# Patient Record
Sex: FEMALE | Race: WHITE | Employment: UNEMPLOYED | URBAN - NONMETROPOLITAN AREA
[De-identification: names, ages, dates, MRNs, and addresses within clinical notes are randomized per-mention and may not be internally consistent; named-entity substitution may affect disease eponyms.]

---

## 2018-05-03 ENCOUNTER — OFFICE VISIT (OUTPATIENT)
Dept: PRIMARY CARE CLINIC | Age: 63
End: 2018-05-03
Payer: COMMERCIAL

## 2018-05-03 VITALS
TEMPERATURE: 97.9 F | HEART RATE: 72 BPM | WEIGHT: 130 LBS | HEIGHT: 68 IN | SYSTOLIC BLOOD PRESSURE: 138 MMHG | DIASTOLIC BLOOD PRESSURE: 84 MMHG | RESPIRATION RATE: 22 BRPM | BODY MASS INDEX: 19.7 KG/M2

## 2018-05-03 DIAGNOSIS — Z12.31 SCREENING MAMMOGRAM, ENCOUNTER FOR: ICD-10-CM

## 2018-05-03 DIAGNOSIS — F90.9 ATTENTION DEFICIT HYPERACTIVITY DISORDER (ADHD), UNSPECIFIED ADHD TYPE: Primary | ICD-10-CM

## 2018-05-03 DIAGNOSIS — R68.84 CHRONIC JAW PAIN: ICD-10-CM

## 2018-05-03 DIAGNOSIS — G89.29 CHRONIC JAW PAIN: ICD-10-CM

## 2018-05-03 DIAGNOSIS — Z12.11 SCREENING FOR COLON CANCER: ICD-10-CM

## 2018-05-03 DIAGNOSIS — H66.3X1 CHRONIC SUPPURATIVE OTITIS MEDIA OF RIGHT EAR, UNSPECIFIED OTITIS MEDIA LOCATION: ICD-10-CM

## 2018-05-03 PROCEDURE — G8427 DOCREV CUR MEDS BY ELIG CLIN: HCPCS | Performed by: FAMILY MEDICINE

## 2018-05-03 PROCEDURE — 3017F COLORECTAL CA SCREEN DOC REV: CPT | Performed by: FAMILY MEDICINE

## 2018-05-03 PROCEDURE — 99203 OFFICE O/P NEW LOW 30 MIN: CPT | Performed by: FAMILY MEDICINE

## 2018-05-03 PROCEDURE — G8420 CALC BMI NORM PARAMETERS: HCPCS | Performed by: FAMILY MEDICINE

## 2018-05-03 PROCEDURE — 4004F PT TOBACCO SCREEN RCVD TLK: CPT | Performed by: FAMILY MEDICINE

## 2018-05-03 RX ORDER — DEXMETHYLPHENIDATE HYDROCHLORIDE 20 MG/1
20 CAPSULE, EXTENDED RELEASE ORAL DAILY
Qty: 30 CAPSULE | Refills: 0 | Status: SHIPPED | OUTPATIENT
Start: 2018-05-03 | End: 2018-05-30 | Stop reason: SDUPTHER

## 2018-05-03 RX ORDER — DEXMETHYLPHENIDATE HYDROCHLORIDE 20 MG/1
20 CAPSULE, EXTENDED RELEASE ORAL DAILY
COMMUNITY
End: 2018-05-03 | Stop reason: SDUPTHER

## 2018-05-03 RX ORDER — OFLOXACIN 3 MG/ML
5 SOLUTION AURICULAR (OTIC) 2 TIMES DAILY
Qty: 5 ML | Refills: 0 | Status: SHIPPED | OUTPATIENT
Start: 2018-05-03 | End: 2018-05-13

## 2018-05-03 RX ORDER — CYCLOBENZAPRINE HCL 5 MG
5 TABLET ORAL 2 TIMES DAILY PRN
Qty: 45 TABLET | Refills: 2 | Status: SHIPPED | OUTPATIENT
Start: 2018-05-03 | End: 2018-05-13

## 2018-05-03 ASSESSMENT — ENCOUNTER SYMPTOMS
COUGH: 0
ABDOMINAL PAIN: 0
VOMITING: 0
BACK PAIN: 0
NAUSEA: 0
WHEEZING: 0
COLOR CHANGE: 0
EYE DISCHARGE: 0
DIARRHEA: 0

## 2018-05-08 ENCOUNTER — TELEPHONE (OUTPATIENT)
Dept: OTOLARYNGOLOGY | Age: 63
End: 2018-05-08

## 2018-05-09 ENCOUNTER — TELEPHONE (OUTPATIENT)
Dept: GASTROENTEROLOGY | Age: 63
End: 2018-05-09

## 2018-05-11 ENCOUNTER — TELEPHONE (OUTPATIENT)
Dept: OTOLARYNGOLOGY | Age: 63
End: 2018-05-11

## 2018-05-16 ENCOUNTER — TELEPHONE (OUTPATIENT)
Dept: OTOLARYNGOLOGY | Age: 63
End: 2018-05-16

## 2018-05-29 ENCOUNTER — TELEPHONE (OUTPATIENT)
Dept: PRIMARY CARE CLINIC | Age: 63
End: 2018-05-29

## 2018-05-30 DIAGNOSIS — F90.9 ATTENTION DEFICIT HYPERACTIVITY DISORDER (ADHD), UNSPECIFIED ADHD TYPE: Primary | ICD-10-CM

## 2018-05-30 RX ORDER — DEXMETHYLPHENIDATE HYDROCHLORIDE 20 MG/1
20 CAPSULE, EXTENDED RELEASE ORAL DAILY
Qty: 30 CAPSULE | Refills: 0 | Status: SHIPPED | OUTPATIENT
Start: 2018-05-30 | End: 2018-06-29

## 2018-06-04 ENCOUNTER — OFFICE VISIT (OUTPATIENT)
Dept: PRIMARY CARE CLINIC | Age: 63
End: 2018-06-04
Payer: COMMERCIAL

## 2018-06-04 VITALS
OXYGEN SATURATION: 98 % | TEMPERATURE: 98.1 F | DIASTOLIC BLOOD PRESSURE: 86 MMHG | RESPIRATION RATE: 22 BRPM | HEIGHT: 68 IN | WEIGHT: 119 LBS | BODY MASS INDEX: 18.04 KG/M2 | SYSTOLIC BLOOD PRESSURE: 134 MMHG | HEART RATE: 65 BPM

## 2018-06-04 DIAGNOSIS — G89.29 CHRONIC JAW PAIN: Primary | ICD-10-CM

## 2018-06-04 DIAGNOSIS — R68.84 CHRONIC JAW PAIN: Primary | ICD-10-CM

## 2018-06-04 PROCEDURE — 99213 OFFICE O/P EST LOW 20 MIN: CPT | Performed by: FAMILY MEDICINE

## 2018-06-04 PROCEDURE — G8427 DOCREV CUR MEDS BY ELIG CLIN: HCPCS | Performed by: FAMILY MEDICINE

## 2018-06-04 PROCEDURE — G8419 CALC BMI OUT NRM PARAM NOF/U: HCPCS | Performed by: FAMILY MEDICINE

## 2018-06-04 PROCEDURE — 4004F PT TOBACCO SCREEN RCVD TLK: CPT | Performed by: FAMILY MEDICINE

## 2018-06-04 PROCEDURE — 3017F COLORECTAL CA SCREEN DOC REV: CPT | Performed by: FAMILY MEDICINE

## 2018-06-04 RX ORDER — TIZANIDINE 4 MG/1
4 TABLET ORAL 3 TIMES DAILY
Qty: 45 TABLET | Refills: 0 | Status: SHIPPED | OUTPATIENT
Start: 2018-06-04 | End: 2018-06-29

## 2018-06-14 ASSESSMENT — ENCOUNTER SYMPTOMS
VOMITING: 0
DIARRHEA: 0
EYE DISCHARGE: 0
WHEEZING: 0
NAUSEA: 0
COLOR CHANGE: 0
BACK PAIN: 0
COUGH: 0
ABDOMINAL PAIN: 0

## 2018-06-18 ENCOUNTER — OFFICE VISIT (OUTPATIENT)
Dept: URGENT CARE | Age: 63
End: 2018-06-18

## 2018-06-18 ENCOUNTER — OFFICE VISIT (OUTPATIENT)
Dept: OTOLARYNGOLOGY | Age: 63
End: 2018-06-18
Payer: COMMERCIAL

## 2018-06-18 VITALS
HEIGHT: 68 IN | WEIGHT: 115 LBS | HEART RATE: 72 BPM | DIASTOLIC BLOOD PRESSURE: 80 MMHG | OXYGEN SATURATION: 98 % | BODY MASS INDEX: 17.43 KG/M2 | TEMPERATURE: 98.1 F | SYSTOLIC BLOOD PRESSURE: 138 MMHG

## 2018-06-18 DIAGNOSIS — H66.3X1 CHRONIC SUPPURATIVE OTITIS MEDIA OF RIGHT EAR, UNSPECIFIED OTITIS MEDIA LOCATION: Primary | ICD-10-CM

## 2018-06-18 DIAGNOSIS — R68.84 JAW PAIN: Primary | ICD-10-CM

## 2018-06-18 PROCEDURE — G8419 CALC BMI OUT NRM PARAM NOF/U: HCPCS | Performed by: OTOLARYNGOLOGY

## 2018-06-18 PROCEDURE — 99242 OFF/OP CONSLTJ NEW/EST SF 20: CPT | Performed by: OTOLARYNGOLOGY

## 2018-06-18 PROCEDURE — G8427 DOCREV CUR MEDS BY ELIG CLIN: HCPCS | Performed by: OTOLARYNGOLOGY

## 2018-06-18 PROCEDURE — 3017F COLORECTAL CA SCREEN DOC REV: CPT | Performed by: OTOLARYNGOLOGY

## 2018-06-18 RX ORDER — CIPROFLOXACIN HYDROCHLORIDE 3.5 MG/ML
SOLUTION/ DROPS TOPICAL
Qty: 1 BOTTLE | Refills: 5 | Status: SHIPPED | OUTPATIENT
Start: 2018-06-18

## 2018-06-18 RX ORDER — CEFUROXIME AXETIL 500 MG/1
500 TABLET ORAL 2 TIMES DAILY
Qty: 20 TABLET | Refills: 0 | Status: SHIPPED | OUTPATIENT
Start: 2018-06-18 | End: 2018-06-28

## 2018-06-19 ENCOUNTER — TELEPHONE (OUTPATIENT)
Dept: PRIMARY CARE CLINIC | Age: 63
End: 2018-06-19

## 2018-06-29 ENCOUNTER — OFFICE VISIT (OUTPATIENT)
Dept: PRIMARY CARE CLINIC | Age: 63
End: 2018-06-29
Payer: COMMERCIAL

## 2018-06-29 VITALS
RESPIRATION RATE: 16 BRPM | SYSTOLIC BLOOD PRESSURE: 146 MMHG | TEMPERATURE: 96.7 F | WEIGHT: 124.6 LBS | HEART RATE: 74 BPM | BODY MASS INDEX: 18.88 KG/M2 | HEIGHT: 68 IN | DIASTOLIC BLOOD PRESSURE: 70 MMHG

## 2018-06-29 DIAGNOSIS — R68.84 CHRONIC JAW PAIN: ICD-10-CM

## 2018-06-29 DIAGNOSIS — G89.29 CHRONIC JAW PAIN: ICD-10-CM

## 2018-06-29 DIAGNOSIS — R68.84 JAW PAIN: Primary | ICD-10-CM

## 2018-06-29 PROCEDURE — 99213 OFFICE O/P EST LOW 20 MIN: CPT | Performed by: NURSE PRACTITIONER

## 2018-06-29 PROCEDURE — 96372 THER/PROPH/DIAG INJ SC/IM: CPT | Performed by: NURSE PRACTITIONER

## 2018-06-29 PROCEDURE — G8420 CALC BMI NORM PARAMETERS: HCPCS | Performed by: NURSE PRACTITIONER

## 2018-06-29 PROCEDURE — G8427 DOCREV CUR MEDS BY ELIG CLIN: HCPCS | Performed by: NURSE PRACTITIONER

## 2018-06-29 PROCEDURE — 3017F COLORECTAL CA SCREEN DOC REV: CPT | Performed by: NURSE PRACTITIONER

## 2018-06-29 PROCEDURE — 4004F PT TOBACCO SCREEN RCVD TLK: CPT | Performed by: NURSE PRACTITIONER

## 2018-06-29 RX ORDER — TRIAMCINOLONE ACETONIDE 40 MG/ML
40 INJECTION, SUSPENSION INTRA-ARTICULAR; INTRAMUSCULAR ONCE
Status: COMPLETED | OUTPATIENT
Start: 2018-06-29 | End: 2018-06-29

## 2018-06-29 RX ORDER — HYDROCODONE BITARTRATE AND ACETAMINOPHEN 7.5; 325 MG/1; MG/1
1 TABLET ORAL EVERY 6 HOURS PRN
Qty: 12 TABLET | Refills: 0 | Status: SHIPPED | OUTPATIENT
Start: 2018-06-29 | End: 2018-07-02

## 2018-06-29 RX ORDER — KETOROLAC TROMETHAMINE 30 MG/ML
60 INJECTION, SOLUTION INTRAMUSCULAR; INTRAVENOUS ONCE
Status: COMPLETED | OUTPATIENT
Start: 2018-06-29 | End: 2018-06-29

## 2018-06-29 RX ADMIN — TRIAMCINOLONE ACETONIDE 40 MG: 40 INJECTION, SUSPENSION INTRA-ARTICULAR; INTRAMUSCULAR at 15:16

## 2018-06-29 RX ADMIN — KETOROLAC TROMETHAMINE 60 MG: 30 INJECTION, SOLUTION INTRAMUSCULAR; INTRAVENOUS at 15:15

## 2018-06-29 ASSESSMENT — PATIENT HEALTH QUESTIONNAIRE - PHQ9
1. LITTLE INTEREST OR PLEASURE IN DOING THINGS: 0
SUM OF ALL RESPONSES TO PHQ QUESTIONS 1-9: 0
SUM OF ALL RESPONSES TO PHQ9 QUESTIONS 1 & 2: 0
2. FEELING DOWN, DEPRESSED OR HOPELESS: 0

## 2018-07-09 RX ORDER — TIZANIDINE 4 MG/1
TABLET ORAL
Qty: 45 TABLET | Refills: 3 | Status: SHIPPED | OUTPATIENT
Start: 2018-07-09

## 2018-07-13 ENCOUNTER — HOSPITAL ENCOUNTER (OUTPATIENT)
Dept: CT IMAGING | Age: 63
Discharge: HOME | End: 2018-07-13
Payer: COMMERCIAL

## 2018-07-13 DIAGNOSIS — R68.84 CHRONIC JAW PAIN: ICD-10-CM

## 2018-07-13 DIAGNOSIS — R68.84 JAW PAIN: ICD-10-CM

## 2018-07-13 DIAGNOSIS — G89.29 CHRONIC JAW PAIN: ICD-10-CM

## 2018-07-13 PROCEDURE — 70486 CT MAXILLOFACIAL W/O DYE: CPT

## 2018-07-19 ENCOUNTER — OFFICE VISIT (OUTPATIENT)
Dept: PRIMARY CARE CLINIC | Age: 63
End: 2018-07-19
Payer: COMMERCIAL

## 2018-07-19 VITALS
HEIGHT: 68 IN | TEMPERATURE: 97.9 F | HEART RATE: 83 BPM | WEIGHT: 110 LBS | OXYGEN SATURATION: 98 % | DIASTOLIC BLOOD PRESSURE: 104 MMHG | SYSTOLIC BLOOD PRESSURE: 186 MMHG | BODY MASS INDEX: 16.67 KG/M2

## 2018-07-19 DIAGNOSIS — R68.84 CHRONIC JAW PAIN: Primary | ICD-10-CM

## 2018-07-19 DIAGNOSIS — G89.29 CHRONIC JAW PAIN: Primary | ICD-10-CM

## 2018-07-19 DIAGNOSIS — F90.9 ATTENTION DEFICIT HYPERACTIVITY DISORDER (ADHD), UNSPECIFIED ADHD TYPE: ICD-10-CM

## 2018-07-19 PROCEDURE — 3017F COLORECTAL CA SCREEN DOC REV: CPT | Performed by: FAMILY MEDICINE

## 2018-07-19 PROCEDURE — 99214 OFFICE O/P EST MOD 30 MIN: CPT | Performed by: FAMILY MEDICINE

## 2018-07-19 PROCEDURE — G8419 CALC BMI OUT NRM PARAM NOF/U: HCPCS | Performed by: FAMILY MEDICINE

## 2018-07-19 PROCEDURE — 4004F PT TOBACCO SCREEN RCVD TLK: CPT | Performed by: FAMILY MEDICINE

## 2018-07-19 PROCEDURE — G8427 DOCREV CUR MEDS BY ELIG CLIN: HCPCS | Performed by: FAMILY MEDICINE

## 2018-07-19 RX ORDER — HYDROCODONE BITARTRATE AND ACETAMINOPHEN 5; 325 MG/1; MG/1
1 TABLET ORAL EVERY 6 HOURS PRN
COMMUNITY

## 2018-07-19 ASSESSMENT — ENCOUNTER SYMPTOMS
COLOR CHANGE: 0
COUGH: 0
DIARRHEA: 0
BACK PAIN: 0
EYE DISCHARGE: 0
WHEEZING: 0
VOMITING: 0
NAUSEA: 0
ABDOMINAL PAIN: 0

## 2018-07-19 NOTE — PROGRESS NOTES
SUBJECTIVE:    Patient ID: Neo Cruz is a 59 y.o. female. HPI:   Patient is here today for six-month follow-up. She is still having a lot of jaw pain in her right jaw. 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 She was previously seeing an oral surgeon in [place] but has moved here since and has not been able to see anyone. She did see ENT. She states that she is still taking her Focalin XR. This is still working well for her. She is not having any side effects to the medication. She states that she is tolerating it well. She states that it is helping with her symptoms. Past Medical History:   Diagnosis Date    ADHD (attention deficit hyperactivity disorder)     Chronic jaw pain       Current Outpatient Prescriptions   Medication Sig Dispense Refill    HYDROcodone-acetaminophen (NORCO) 5-325 MG per tablet Take 1 tablet by mouth every 6 hours as needed for Pain. Yonatan Newman  tiZANidine (ZANAFLEX) 4 MG tablet TAKE 1 TABLET BY MOUTH THREE TIMES A DAY 45 tablet 3    Dexmethylphenidate HCl ER (FOCALIN XR) 20 MG CP24 Take 1 capsule by mouth daily for 30 days. . 30 capsule 0    ciprofloxacin (CILOXAN) 0.3 % ophthalmic solution 4-5 drops right ear 3 times daily for 2 weeks. 1 Bottle 5     No current facility-administered medications for this visit. No Known Allergies    Review of Systems   Constitutional: Negative for activity change, appetite change and fever. HENT: Negative for congestion and nosebleeds. Right jaw pain     Eyes: Negative for discharge. Respiratory: Negative for cough and wheezing. Cardiovascular: Negative for chest pain and leg swelling. Gastrointestinal: Negative for abdominal pain, diarrhea, nausea and vomiting. Genitourinary: Negative for difficulty urinating, frequency and urgency. Musculoskeletal: Negative for back pain and gait problem. Skin: Negative for color change and rash.    Neurological: Negative for dizziness and headaches. Hematological: Does not bruise/bleed easily. Psychiatric/Behavioral: Negative for sleep disturbance and suicidal ideas. OBJECTIVE:    Physical Exam   Constitutional: She is oriented to person, place, and time. She appears well-developed. No distress. HENT:   Head: Normocephalic and atraumatic. Neck: Normal range of motion. Neck supple. Cardiovascular: Normal rate, regular rhythm and normal heart sounds. No murmur heard. Pulmonary/Chest: Effort normal and breath sounds normal. No respiratory distress. Neurological: She is alert and oriented to person, place, and time. Skin: Skin is warm and dry. She is not diaphoretic. Psychiatric: She has a normal mood and affect. Her behavior is normal. Judgment and thought content normal.      BP (!) 186/104 (Site: Left Arm, Position: Sitting, Cuff Size: Medium Adult)   Pulse 83   Temp 97.9 °F (36.6 °C) (Temporal)   Ht 5' 8\" (1.727 m)   Wt 110 lb (49.9 kg)   SpO2 98%   BMI 16.73 kg/m²      ASSESSMENT:    Sugar Denise was seen today for 1 month follow-up. Diagnoses and all orders for this visit:    Chronic jaw pain  -     Ambulatory referral to Oral Maxillofacial Surgery    Attention deficit hyperactivity disorder (ADHD), unspecified ADHD type        PLAN:    We will try to get her set up with an oral surgeon here. If they do not take her insurance discussed that she would have to go back to [place]. Continue Focalin for ADHD. Follow-up in 6 months for checkup unless needed sooner. EMR Dragon/transcription disclaimer:  Much of this encounter note is electronic transcription/translation of spoken language to printed texts. The electronic translation of spoken language may be erroneous, or at times, nonsensical words or phrases may be inadvertently transcribed.   Although I have reviewed the note for such errors, some may still exist.

## 2018-07-24 RX ORDER — HYDROCODONE BITARTRATE AND ACETAMINOPHEN 5; 325 MG/1; MG/1
1 TABLET ORAL EVERY 6 HOURS PRN
Qty: 60 TABLET | Refills: 0 | OUTPATIENT
Start: 2018-07-24 | End: 2018-08-23

## 2018-07-26 ENCOUNTER — TELEPHONE (OUTPATIENT)
Dept: PRIMARY CARE CLINIC | Age: 63
End: 2018-07-26

## 2018-07-26 DIAGNOSIS — G89.29 CHRONIC JAW PAIN: Primary | ICD-10-CM

## 2018-07-26 DIAGNOSIS — R68.84 CHRONIC JAW PAIN: Primary | ICD-10-CM

## 2018-07-26 NOTE — TELEPHONE ENCOUNTER
Pt states Kate gave her pain med on 6/29/18. (12). I told her we could set her up with pain management. She agreed. She has appt with Oral surgeon Aug 29.

## 2018-08-02 DIAGNOSIS — F98.8 ATTENTION DEFICIT DISORDER (ADD) WITHOUT HYPERACTIVITY: ICD-10-CM

## 2018-08-03 RX ORDER — DEXMETHYLPHENIDATE HYDROCHLORIDE 20 MG/1
20 CAPSULE, EXTENDED RELEASE ORAL DAILY
Qty: 30 CAPSULE | Refills: 0 | Status: SHIPPED | OUTPATIENT
Start: 2018-08-03 | End: 2018-09-10 | Stop reason: SDUPTHER

## 2018-09-10 DIAGNOSIS — F98.8 ATTENTION DEFICIT DISORDER (ADD) WITHOUT HYPERACTIVITY: ICD-10-CM

## 2018-09-10 RX ORDER — DEXMETHYLPHENIDATE HYDROCHLORIDE 20 MG/1
20 CAPSULE, EXTENDED RELEASE ORAL DAILY
Qty: 30 CAPSULE | Refills: 0 | Status: SHIPPED | OUTPATIENT
Start: 2018-09-10 | End: 2018-10-11 | Stop reason: SDUPTHER

## 2018-10-11 DIAGNOSIS — F98.8 ATTENTION DEFICIT DISORDER (ADD) WITHOUT HYPERACTIVITY: ICD-10-CM

## 2018-10-11 RX ORDER — DEXMETHYLPHENIDATE HYDROCHLORIDE 20 MG/1
20 CAPSULE, EXTENDED RELEASE ORAL DAILY
Qty: 30 CAPSULE | Refills: 0 | Status: SHIPPED | OUTPATIENT
Start: 2018-10-11 | End: 2018-11-10

## 2018-11-12 DIAGNOSIS — F98.8 ATTENTION DEFICIT DISORDER (ADD) WITHOUT HYPERACTIVITY: ICD-10-CM

## 2018-11-12 RX ORDER — DEXMETHYLPHENIDATE HYDROCHLORIDE 20 MG/1
20 CAPSULE, EXTENDED RELEASE ORAL DAILY
Qty: 30 CAPSULE | Refills: 0 | Status: SHIPPED | OUTPATIENT
Start: 2018-11-12 | End: 2018-12-12

## 2021-11-07 ENCOUNTER — HOSPITAL ENCOUNTER (OUTPATIENT)
Facility: HOSPITAL | Age: 66
Setting detail: OBSERVATION
Discharge: HOME | End: 2021-11-08
Attending: STUDENT IN AN ORGANIZED HEALTH CARE EDUCATION/TRAINING PROGRAM | Admitting: FAMILY MEDICINE

## 2021-11-07 ENCOUNTER — APPOINTMENT (OUTPATIENT)
Dept: GENERAL RADIOLOGY | Facility: HOSPITAL | Age: 66
End: 2021-11-07

## 2021-11-07 DIAGNOSIS — K59.00 CONSTIPATION, UNSPECIFIED CONSTIPATION TYPE: ICD-10-CM

## 2021-11-07 DIAGNOSIS — R07.9 CHEST PAIN, UNSPECIFIED TYPE: Primary | ICD-10-CM

## 2021-11-07 LAB
ALBUMIN SERPL-MCNC: 4.2 G/DL (ref 3.5–5.2)
ALBUMIN/GLOB SERPL: 1.8 G/DL
ALP SERPL-CCNC: 91 U/L (ref 39–117)
ALT SERPL W P-5'-P-CCNC: 17 U/L (ref 1–33)
ANION GAP SERPL CALCULATED.3IONS-SCNC: 9 MMOL/L (ref 5–15)
APTT PPP: 29.6 SECONDS (ref 24.1–35)
AST SERPL-CCNC: 17 U/L (ref 1–32)
BASOPHILS # BLD AUTO: 0.06 10*3/MM3 (ref 0–0.2)
BASOPHILS NFR BLD AUTO: 1 % (ref 0–1.5)
BILIRUB SERPL-MCNC: 0.2 MG/DL (ref 0–1.2)
BUN SERPL-MCNC: 25 MG/DL (ref 8–23)
BUN/CREAT SERPL: 27.5 (ref 7–25)
CALCIUM SPEC-SCNC: 9.6 MG/DL (ref 8.6–10.5)
CHLORIDE SERPL-SCNC: 106 MMOL/L (ref 98–107)
CO2 SERPL-SCNC: 28 MMOL/L (ref 22–29)
CREAT SERPL-MCNC: 0.91 MG/DL (ref 0.57–1)
DEPRECATED RDW RBC AUTO: 41.9 FL (ref 37–54)
EOSINOPHIL # BLD AUTO: 0.13 10*3/MM3 (ref 0–0.4)
EOSINOPHIL NFR BLD AUTO: 2.2 % (ref 0.3–6.2)
ERYTHROCYTE [DISTWIDTH] IN BLOOD BY AUTOMATED COUNT: 13.4 % (ref 12.3–15.4)
GFR SERPL CREATININE-BSD FRML MDRD: 62 ML/MIN/1.73
GLOBULIN UR ELPH-MCNC: 2.3 GM/DL
GLUCOSE SERPL-MCNC: 67 MG/DL (ref 65–99)
HCT VFR BLD AUTO: 40.3 % (ref 34–46.6)
HGB BLD-MCNC: 13.1 G/DL (ref 12–15.9)
HOLD SPECIMEN: NORMAL
HOLD SPECIMEN: NORMAL
IMM GRANULOCYTES # BLD AUTO: 0.01 10*3/MM3 (ref 0–0.05)
IMM GRANULOCYTES NFR BLD AUTO: 0.2 % (ref 0–0.5)
INR PPP: 1.04 (ref 0.91–1.09)
LIPASE SERPL-CCNC: 49 U/L (ref 13–60)
LYMPHOCYTES # BLD AUTO: 2.5 10*3/MM3 (ref 0.7–3.1)
LYMPHOCYTES NFR BLD AUTO: 42.6 % (ref 19.6–45.3)
MCH RBC QN AUTO: 28.1 PG (ref 26.6–33)
MCHC RBC AUTO-ENTMCNC: 32.5 G/DL (ref 31.5–35.7)
MCV RBC AUTO: 86.3 FL (ref 79–97)
MONOCYTES # BLD AUTO: 0.52 10*3/MM3 (ref 0.1–0.9)
MONOCYTES NFR BLD AUTO: 8.9 % (ref 5–12)
NEUTROPHILS NFR BLD AUTO: 2.65 10*3/MM3 (ref 1.7–7)
NEUTROPHILS NFR BLD AUTO: 45.1 % (ref 42.7–76)
NRBC BLD AUTO-RTO: 0 /100 WBC (ref 0–0.2)
PLATELET # BLD AUTO: 225 10*3/MM3 (ref 140–450)
PMV BLD AUTO: 10.1 FL (ref 6–12)
POTASSIUM SERPL-SCNC: 4.3 MMOL/L (ref 3.5–5.2)
PROT SERPL-MCNC: 6.5 G/DL (ref 6–8.5)
PROTHROMBIN TIME: 13.2 SECONDS (ref 11.9–14.6)
RBC # BLD AUTO: 4.67 10*6/MM3 (ref 3.77–5.28)
SARS-COV-2 RNA PNL SPEC NAA+PROBE: NOT DETECTED
SODIUM SERPL-SCNC: 143 MMOL/L (ref 136–145)
TROPONIN T SERPL-MCNC: <0.01 NG/ML (ref 0–0.03)
WBC # BLD AUTO: 5.87 10*3/MM3 (ref 3.4–10.8)
WHOLE BLOOD HOLD SPECIMEN: NORMAL
WHOLE BLOOD HOLD SPECIMEN: NORMAL

## 2021-11-07 PROCEDURE — 96374 THER/PROPH/DIAG INJ IV PUSH: CPT

## 2021-11-07 PROCEDURE — 85610 PROTHROMBIN TIME: CPT | Performed by: STUDENT IN AN ORGANIZED HEALTH CARE EDUCATION/TRAINING PROGRAM

## 2021-11-07 PROCEDURE — 85730 THROMBOPLASTIN TIME PARTIAL: CPT | Performed by: STUDENT IN AN ORGANIZED HEALTH CARE EDUCATION/TRAINING PROGRAM

## 2021-11-07 PROCEDURE — 71045 X-RAY EXAM CHEST 1 VIEW: CPT

## 2021-11-07 PROCEDURE — 87635 SARS-COV-2 COVID-19 AMP PRB: CPT | Performed by: STUDENT IN AN ORGANIZED HEALTH CARE EDUCATION/TRAINING PROGRAM

## 2021-11-07 PROCEDURE — 96375 TX/PRO/DX INJ NEW DRUG ADDON: CPT

## 2021-11-07 PROCEDURE — 99284 EMERGENCY DEPT VISIT MOD MDM: CPT

## 2021-11-07 PROCEDURE — 93010 ELECTROCARDIOGRAM REPORT: CPT | Performed by: INTERNAL MEDICINE

## 2021-11-07 PROCEDURE — 80053 COMPREHEN METABOLIC PANEL: CPT | Performed by: STUDENT IN AN ORGANIZED HEALTH CARE EDUCATION/TRAINING PROGRAM

## 2021-11-07 PROCEDURE — 84484 ASSAY OF TROPONIN QUANT: CPT | Performed by: STUDENT IN AN ORGANIZED HEALTH CARE EDUCATION/TRAINING PROGRAM

## 2021-11-07 PROCEDURE — 36415 COLL VENOUS BLD VENIPUNCTURE: CPT

## 2021-11-07 PROCEDURE — 85025 COMPLETE CBC W/AUTO DIFF WBC: CPT | Performed by: STUDENT IN AN ORGANIZED HEALTH CARE EDUCATION/TRAINING PROGRAM

## 2021-11-07 PROCEDURE — 93005 ELECTROCARDIOGRAM TRACING: CPT | Performed by: STUDENT IN AN ORGANIZED HEALTH CARE EDUCATION/TRAINING PROGRAM

## 2021-11-07 PROCEDURE — 83690 ASSAY OF LIPASE: CPT | Performed by: STUDENT IN AN ORGANIZED HEALTH CARE EDUCATION/TRAINING PROGRAM

## 2021-11-07 PROCEDURE — C9803 HOPD COVID-19 SPEC COLLECT: HCPCS | Performed by: STUDENT IN AN ORGANIZED HEALTH CARE EDUCATION/TRAINING PROGRAM

## 2021-11-07 RX ORDER — NITROGLYCERIN 0.4 MG/1
0.4 TABLET SUBLINGUAL
Status: DISCONTINUED | OUTPATIENT
Start: 2021-11-07 | End: 2021-11-08 | Stop reason: HOSPADM

## 2021-11-07 RX ORDER — LABETALOL HYDROCHLORIDE 5 MG/ML
10 INJECTION, SOLUTION INTRAVENOUS ONCE
Status: COMPLETED | OUTPATIENT
Start: 2021-11-07 | End: 2021-11-07

## 2021-11-07 RX ORDER — ASPIRIN 81 MG/1
324 TABLET, CHEWABLE ORAL ONCE
Status: COMPLETED | OUTPATIENT
Start: 2021-11-07 | End: 2021-11-07

## 2021-11-07 RX ORDER — SODIUM CHLORIDE 0.9 % (FLUSH) 0.9 %
10 SYRINGE (ML) INJECTION AS NEEDED
Status: DISCONTINUED | OUTPATIENT
Start: 2021-11-07 | End: 2021-11-08 | Stop reason: SDUPTHER

## 2021-11-07 RX ADMIN — NITROGLYCERIN 0.4 MG: 0.4 TABLET SUBLINGUAL at 22:03

## 2021-11-07 RX ADMIN — ASPIRIN 324 MG: 81 TABLET, CHEWABLE ORAL at 22:03

## 2021-11-07 RX ADMIN — SODIUM CHLORIDE, POTASSIUM CHLORIDE, SODIUM LACTATE AND CALCIUM CHLORIDE 1000 ML: 600; 310; 30; 20 INJECTION, SOLUTION INTRAVENOUS at 22:02

## 2021-11-07 RX ADMIN — NITROGLYCERIN 0.4 MG: 0.4 TABLET SUBLINGUAL at 22:27

## 2021-11-07 RX ADMIN — LABETALOL HYDROCHLORIDE 10 MG: 5 INJECTION, SOLUTION INTRAVENOUS at 23:37

## 2021-11-08 ENCOUNTER — APPOINTMENT (OUTPATIENT)
Dept: CARDIOLOGY | Facility: HOSPITAL | Age: 66
End: 2021-11-08

## 2021-11-08 ENCOUNTER — APPOINTMENT (OUTPATIENT)
Dept: CT IMAGING | Facility: HOSPITAL | Age: 66
End: 2021-11-08

## 2021-11-08 VITALS
HEART RATE: 73 BPM | HEIGHT: 68 IN | SYSTOLIC BLOOD PRESSURE: 130 MMHG | OXYGEN SATURATION: 95 % | WEIGHT: 130.19 LBS | BODY MASS INDEX: 19.73 KG/M2 | TEMPERATURE: 98.5 F | RESPIRATION RATE: 16 BRPM | DIASTOLIC BLOOD PRESSURE: 76 MMHG

## 2021-11-08 PROBLEM — G89.29 CHRONIC JAW PAIN: Status: ACTIVE | Noted: 2018-05-03

## 2021-11-08 PROBLEM — K59.00 CONSTIPATION: Status: ACTIVE | Noted: 2021-11-08

## 2021-11-08 PROBLEM — R68.84 CHRONIC JAW PAIN: Status: ACTIVE | Noted: 2018-05-03

## 2021-11-08 PROBLEM — R07.9 CHEST PAIN: Status: ACTIVE | Noted: 2021-11-08

## 2021-11-08 PROBLEM — F90.9 ATTENTION DEFICIT HYPERACTIVITY DISORDER (ADHD): Status: ACTIVE | Noted: 2018-05-03

## 2021-11-08 PROBLEM — J43.9 PULMONARY EMPHYSEMA (HCC): Chronic | Status: ACTIVE | Noted: 2021-11-08

## 2021-11-08 PROBLEM — I10 ESSENTIAL HYPERTENSION, BENIGN: Chronic | Status: ACTIVE | Noted: 2021-11-08

## 2021-11-08 PROBLEM — H66.3X1 CHRONIC SUPPURATIVE OTITIS MEDIA OF RIGHT EAR: Status: ACTIVE | Noted: 2018-05-03

## 2021-11-08 LAB
ALBUMIN SERPL-MCNC: 3.9 G/DL (ref 3.5–5.2)
ALBUMIN/GLOB SERPL: 1.6 G/DL
ALP SERPL-CCNC: 81 U/L (ref 39–117)
ALT SERPL W P-5'-P-CCNC: 17 U/L (ref 1–33)
ANION GAP SERPL CALCULATED.3IONS-SCNC: 12 MMOL/L (ref 5–15)
AST SERPL-CCNC: 20 U/L (ref 1–32)
BH CV STRESS ATROPINE STAGE 5: 2
BH CV STRESS BP STAGE 1: NORMAL
BH CV STRESS BP STAGE 2: NORMAL
BH CV STRESS BP STAGE 3: NORMAL
BH CV STRESS BP STAGE 4: NORMAL
BH CV STRESS BP STAGE 5: NORMAL
BH CV STRESS DOSE DOBUTAMINE STAGE 1: 10
BH CV STRESS DOSE DOBUTAMINE STAGE 2: 20
BH CV STRESS DOSE DOBUTAMINE STAGE 3: 30
BH CV STRESS DOSE DOBUTAMINE STAGE 4: 40
BH CV STRESS DOSE DOBUTAMINE STAGE 5: 50
BH CV STRESS DURATION MIN STAGE 1: 3
BH CV STRESS DURATION MIN STAGE 2: 3
BH CV STRESS DURATION MIN STAGE 3: 3
BH CV STRESS DURATION MIN STAGE 4: 3
BH CV STRESS DURATION MIN STAGE 5: 2
BH CV STRESS DURATION SEC STAGE 1: 0
BH CV STRESS DURATION SEC STAGE 2: 0
BH CV STRESS DURATION SEC STAGE 3: 0
BH CV STRESS DURATION SEC STAGE 4: 0
BH CV STRESS DURATION SEC STAGE 5: 47
BH CV STRESS HR STAGE 1: 57
BH CV STRESS HR STAGE 2: 58
BH CV STRESS HR STAGE 3: 81
BH CV STRESS HR STAGE 4: 81
BH CV STRESS HR STAGE 5: 83
BH CV STRESS PROTOCOL 1: NORMAL
BH CV STRESS RECOVERY BP: NORMAL MMHG
BH CV STRESS RECOVERY HR: 75 BPM
BH CV STRESS STAGE 1: 1
BH CV STRESS STAGE 2: 2
BH CV STRESS STAGE 3: 3
BH CV STRESS STAGE 4: 4
BH CV STRESS STAGE 5: 5
BILIRUB SERPL-MCNC: 0.3 MG/DL (ref 0–1.2)
BUN SERPL-MCNC: 18 MG/DL (ref 8–23)
BUN/CREAT SERPL: 29.5 (ref 7–25)
CALCIUM SPEC-SCNC: 9.1 MG/DL (ref 8.6–10.5)
CHLORIDE SERPL-SCNC: 105 MMOL/L (ref 98–107)
CHOLEST SERPL-MCNC: 172 MG/DL (ref 0–200)
CO2 SERPL-SCNC: 22 MMOL/L (ref 22–29)
CREAT SERPL-MCNC: 0.61 MG/DL (ref 0.57–1)
GFR SERPL CREATININE-BSD FRML MDRD: 98 ML/MIN/1.73
GLOBULIN UR ELPH-MCNC: 2.4 GM/DL
GLUCOSE SERPL-MCNC: 90 MG/DL (ref 65–99)
HDLC SERPL-MCNC: 83 MG/DL (ref 40–60)
LDLC SERPL CALC-MCNC: 76 MG/DL (ref 0–100)
LDLC/HDLC SERPL: 0.9 {RATIO}
MAGNESIUM SERPL-MCNC: 1.8 MG/DL (ref 1.6–2.4)
MAXIMAL PREDICTED HEART RATE: 155 BPM
PERCENT MAX PREDICTED HR: 53.55 %
POTASSIUM SERPL-SCNC: 4 MMOL/L (ref 3.5–5.2)
PROT SERPL-MCNC: 6.3 G/DL (ref 6–8.5)
QT INTERVAL: 374 MS
QT INTERVAL: 436 MS
QTC INTERVAL: 436 MS
QTC INTERVAL: 463 MS
SODIUM SERPL-SCNC: 139 MMOL/L (ref 136–145)
STRESS BASELINE BP: NORMAL MMHG
STRESS BASELINE HR: 60 BPM
STRESS PERCENT HR: 63 %
STRESS POST EXERCISE DUR MIN: 14 MIN
STRESS POST EXERCISE DUR SEC: 47 SEC
STRESS POST PEAK BP: NORMAL MMHG
STRESS POST PEAK HR: 83 BPM
STRESS TARGET HR: 132 BPM
TRIGL SERPL-MCNC: 70 MG/DL (ref 0–150)
TROPONIN T SERPL-MCNC: <0.01 NG/ML (ref 0–0.03)
TROPONIN T SERPL-MCNC: <0.01 NG/ML (ref 0–0.03)
VLDLC SERPL-MCNC: 13 MG/DL (ref 5–40)

## 2021-11-08 PROCEDURE — G0378 HOSPITAL OBSERVATION PER HR: HCPCS

## 2021-11-08 PROCEDURE — 25010000002 METHYLPREDNISOLONE PER 125 MG: Performed by: INTERNAL MEDICINE

## 2021-11-08 PROCEDURE — 0 ATROPINE SULFATE: Performed by: EMERGENCY MEDICINE

## 2021-11-08 PROCEDURE — 93010 ELECTROCARDIOGRAM REPORT: CPT | Performed by: INTERNAL MEDICINE

## 2021-11-08 PROCEDURE — 94799 UNLISTED PULMONARY SVC/PX: CPT

## 2021-11-08 PROCEDURE — 93005 ELECTROCARDIOGRAM TRACING: CPT | Performed by: INTERNAL MEDICINE

## 2021-11-08 PROCEDURE — 83735 ASSAY OF MAGNESIUM: CPT | Performed by: INTERNAL MEDICINE

## 2021-11-08 PROCEDURE — 25010000002 HYDRALAZINE PER 20 MG: Performed by: STUDENT IN AN ORGANIZED HEALTH CARE EDUCATION/TRAINING PROGRAM

## 2021-11-08 PROCEDURE — 93352 ADMIN ECG CONTRAST AGENT: CPT | Performed by: EMERGENCY MEDICINE

## 2021-11-08 PROCEDURE — 93350 STRESS TTE ONLY: CPT | Performed by: EMERGENCY MEDICINE

## 2021-11-08 PROCEDURE — 25010000002 IOPAMIDOL 61 % SOLUTION: Performed by: STUDENT IN AN ORGANIZED HEALTH CARE EDUCATION/TRAINING PROGRAM

## 2021-11-08 PROCEDURE — 0 DOBUTAMINE PER 250 MG: Performed by: EMERGENCY MEDICINE

## 2021-11-08 PROCEDURE — 71260 CT THORAX DX C+: CPT

## 2021-11-08 PROCEDURE — 96361 HYDRATE IV INFUSION ADD-ON: CPT

## 2021-11-08 PROCEDURE — 80061 LIPID PANEL: CPT | Performed by: INTERNAL MEDICINE

## 2021-11-08 PROCEDURE — 93017 CV STRESS TEST TRACING ONLY: CPT

## 2021-11-08 PROCEDURE — 94640 AIRWAY INHALATION TREATMENT: CPT

## 2021-11-08 PROCEDURE — 93350 STRESS TTE ONLY: CPT

## 2021-11-08 PROCEDURE — 96375 TX/PRO/DX INJ NEW DRUG ADDON: CPT

## 2021-11-08 PROCEDURE — 25010000002 DROPERIDOL PER 5 MG: Performed by: STUDENT IN AN ORGANIZED HEALTH CARE EDUCATION/TRAINING PROGRAM

## 2021-11-08 PROCEDURE — 25010000002 PERFLUTREN 6.52 MG/ML SUSPENSION: Performed by: EMERGENCY MEDICINE

## 2021-11-08 PROCEDURE — 84484 ASSAY OF TROPONIN QUANT: CPT | Performed by: INTERNAL MEDICINE

## 2021-11-08 PROCEDURE — 74177 CT ABD & PELVIS W/CONTRAST: CPT

## 2021-11-08 PROCEDURE — 93018 CV STRESS TEST I&R ONLY: CPT | Performed by: EMERGENCY MEDICINE

## 2021-11-08 PROCEDURE — 96376 TX/PRO/DX INJ SAME DRUG ADON: CPT

## 2021-11-08 PROCEDURE — 80053 COMPREHEN METABOLIC PANEL: CPT | Performed by: INTERNAL MEDICINE

## 2021-11-08 RX ORDER — SODIUM CHLORIDE 0.9 % (FLUSH) 0.9 %
10 SYRINGE (ML) INJECTION EVERY 12 HOURS SCHEDULED
Status: DISCONTINUED | OUTPATIENT
Start: 2021-11-08 | End: 2021-11-08 | Stop reason: HOSPADM

## 2021-11-08 RX ORDER — ACETAMINOPHEN 325 MG/1
650 TABLET ORAL EVERY 4 HOURS PRN
Status: DISCONTINUED | OUTPATIENT
Start: 2021-11-08 | End: 2021-11-08 | Stop reason: HOSPADM

## 2021-11-08 RX ORDER — SODIUM CHLORIDE 0.9 % (FLUSH) 0.9 %
10 SYRINGE (ML) INJECTION AS NEEDED
Status: DISCONTINUED | OUTPATIENT
Start: 2021-11-08 | End: 2021-11-08 | Stop reason: HOSPADM

## 2021-11-08 RX ORDER — MAGNESIUM CARB/ALUMINUM HYDROX 105-160MG
296 TABLET,CHEWABLE ORAL ONCE
Status: DISCONTINUED | OUTPATIENT
Start: 2021-11-08 | End: 2021-11-08 | Stop reason: SDDI

## 2021-11-08 RX ORDER — CYCLOBENZAPRINE HCL 5 MG
5 TABLET ORAL 3 TIMES DAILY PRN
Status: ON HOLD | COMMUNITY
End: 2021-11-08

## 2021-11-08 RX ORDER — SODIUM CHLORIDE, SODIUM LACTATE, POTASSIUM CHLORIDE, CALCIUM CHLORIDE 600; 310; 30; 20 MG/100ML; MG/100ML; MG/100ML; MG/100ML
100 INJECTION, SOLUTION INTRAVENOUS CONTINUOUS
Status: DISCONTINUED | OUTPATIENT
Start: 2021-11-08 | End: 2021-11-08 | Stop reason: HOSPADM

## 2021-11-08 RX ORDER — POLYETHYLENE GLYCOL 3350 17 G/17G
17 POWDER, FOR SOLUTION ORAL DAILY
Status: DISCONTINUED | OUTPATIENT
Start: 2021-11-08 | End: 2021-11-08 | Stop reason: HOSPADM

## 2021-11-08 RX ORDER — ONDANSETRON 2 MG/ML
4 INJECTION INTRAMUSCULAR; INTRAVENOUS EVERY 6 HOURS PRN
Status: DISCONTINUED | OUTPATIENT
Start: 2021-11-08 | End: 2021-11-08 | Stop reason: HOSPADM

## 2021-11-08 RX ORDER — ONDANSETRON 4 MG/1
4 TABLET, FILM COATED ORAL EVERY 6 HOURS PRN
Status: DISCONTINUED | OUTPATIENT
Start: 2021-11-08 | End: 2021-11-08 | Stop reason: HOSPADM

## 2021-11-08 RX ORDER — NICOTINE 21 MG/24HR
1 PATCH, TRANSDERMAL 24 HOURS TRANSDERMAL
Status: DISCONTINUED | OUTPATIENT
Start: 2021-11-08 | End: 2021-11-08 | Stop reason: HOSPADM

## 2021-11-08 RX ORDER — DROPERIDOL 2.5 MG/ML
2.5 INJECTION, SOLUTION INTRAMUSCULAR; INTRAVENOUS ONCE
Status: COMPLETED | OUTPATIENT
Start: 2021-11-08 | End: 2021-11-08

## 2021-11-08 RX ORDER — IPRATROPIUM BROMIDE AND ALBUTEROL SULFATE 2.5; .5 MG/3ML; MG/3ML
3 SOLUTION RESPIRATORY (INHALATION)
Status: DISCONTINUED | OUTPATIENT
Start: 2021-11-08 | End: 2021-11-08

## 2021-11-08 RX ORDER — PREDNISONE 5 MG/1
1 TABLET ORAL TAKE AS DIRECTED
Qty: 21 EACH | Refills: 0 | Status: SHIPPED | OUTPATIENT
Start: 2021-11-08

## 2021-11-08 RX ORDER — ACETAMINOPHEN 160 MG/5ML
650 SOLUTION ORAL EVERY 4 HOURS PRN
Status: DISCONTINUED | OUTPATIENT
Start: 2021-11-08 | End: 2021-11-08 | Stop reason: HOSPADM

## 2021-11-08 RX ORDER — IPRATROPIUM BROMIDE AND ALBUTEROL SULFATE 2.5; .5 MG/3ML; MG/3ML
3 SOLUTION RESPIRATORY (INHALATION)
Status: DISCONTINUED | OUTPATIENT
Start: 2021-11-08 | End: 2021-11-08 | Stop reason: HOSPADM

## 2021-11-08 RX ORDER — METHYLPREDNISOLONE SODIUM SUCCINATE 125 MG/2ML
60 INJECTION, POWDER, LYOPHILIZED, FOR SOLUTION INTRAMUSCULAR; INTRAVENOUS EVERY 6 HOURS
Status: DISCONTINUED | OUTPATIENT
Start: 2021-11-08 | End: 2021-11-08

## 2021-11-08 RX ORDER — ACETAMINOPHEN 650 MG/1
650 SUPPOSITORY RECTAL EVERY 4 HOURS PRN
Status: DISCONTINUED | OUTPATIENT
Start: 2021-11-08 | End: 2021-11-08 | Stop reason: HOSPADM

## 2021-11-08 RX ORDER — LABETALOL HYDROCHLORIDE 5 MG/ML
20 INJECTION, SOLUTION INTRAVENOUS EVERY 4 HOURS PRN
Status: DISCONTINUED | OUTPATIENT
Start: 2021-11-08 | End: 2021-11-08 | Stop reason: HOSPADM

## 2021-11-08 RX ORDER — LISINOPRIL 10 MG/1
10 TABLET ORAL DAILY
COMMUNITY

## 2021-11-08 RX ORDER — HYDRALAZINE HYDROCHLORIDE 20 MG/ML
10 INJECTION INTRAMUSCULAR; INTRAVENOUS ONCE
Status: COMPLETED | OUTPATIENT
Start: 2021-11-08 | End: 2021-11-08

## 2021-11-08 RX ORDER — DOBUTAMINE HYDROCHLORIDE 100 MG/100ML
10-50 INJECTION INTRAVENOUS CONTINUOUS
Status: DISCONTINUED | OUTPATIENT
Start: 2021-11-08 | End: 2021-11-08

## 2021-11-08 RX ORDER — ASPIRIN 81 MG/1
81 TABLET ORAL DAILY
Status: DISCONTINUED | OUTPATIENT
Start: 2021-11-08 | End: 2021-11-08 | Stop reason: HOSPADM

## 2021-11-08 RX ORDER — ASPIRIN 81 MG/1
324 TABLET, CHEWABLE ORAL ONCE
Status: DISCONTINUED | OUTPATIENT
Start: 2021-11-08 | End: 2021-11-08

## 2021-11-08 RX ADMIN — ACETAMINOPHEN 650 MG: 325 TABLET, FILM COATED ORAL at 06:07

## 2021-11-08 RX ADMIN — METHYLPREDNISOLONE SODIUM SUCCINATE 60 MG: 125 INJECTION, POWDER, FOR SOLUTION INTRAMUSCULAR; INTRAVENOUS at 06:01

## 2021-11-08 RX ADMIN — ATROPINE SULFATE 2 MG: 0.1 INJECTION PARENTERAL at 08:36

## 2021-11-08 RX ADMIN — IPRATROPIUM BROMIDE AND ALBUTEROL SULFATE 3 ML: 2.5; .5 SOLUTION RESPIRATORY (INHALATION) at 14:38

## 2021-11-08 RX ADMIN — DROPERIDOL 2.5 MG: 2.5 INJECTION, SOLUTION INTRAMUSCULAR; INTRAVENOUS at 00:49

## 2021-11-08 RX ADMIN — PERFLUTREN 8.48 MG: 6.52 INJECTION, SUSPENSION INTRAVENOUS at 08:16

## 2021-11-08 RX ADMIN — IPRATROPIUM BROMIDE AND ALBUTEROL SULFATE 3 ML: 2.5; .5 SOLUTION RESPIRATORY (INHALATION) at 10:26

## 2021-11-08 RX ADMIN — SODIUM CHLORIDE, POTASSIUM CHLORIDE, SODIUM LACTATE AND CALCIUM CHLORIDE 100 ML/HR: 600; 310; 30; 20 INJECTION, SOLUTION INTRAVENOUS at 06:00

## 2021-11-08 RX ADMIN — Medication 10 MCG/KG/MIN: at 08:17

## 2021-11-08 RX ADMIN — IPRATROPIUM BROMIDE AND ALBUTEROL SULFATE 3 ML: 2.5; .5 SOLUTION RESPIRATORY (INHALATION) at 06:49

## 2021-11-08 RX ADMIN — LABETALOL HYDROCHLORIDE 20 MG: 5 INJECTION, SOLUTION INTRAVENOUS at 07:48

## 2021-11-08 RX ADMIN — IOPAMIDOL 100 ML: 612 INJECTION, SOLUTION INTRAVENOUS at 00:29

## 2021-11-08 RX ADMIN — HYDRALAZINE HYDROCHLORIDE 10 MG: 20 INJECTION INTRAMUSCULAR; INTRAVENOUS at 04:16

## 2021-11-08 RX ADMIN — NICOTINE 1 PATCH: 14 PATCH, EXTENDED RELEASE TRANSDERMAL at 09:47

## 2021-11-08 RX ADMIN — POLYETHYLENE GLYCOL 3350 17 G: 17 POWDER, FOR SOLUTION ORAL at 09:51

## 2021-11-08 RX ADMIN — ASPIRIN 81 MG: 81 TABLET, COATED ORAL at 09:47

## 2021-11-08 NOTE — PLAN OF CARE
Goal Outcome Evaluation: Outcome Summary: NTN assessment completed. Pt reported poor appeite. Pt unable to answer many questions due to lethargy. Will start Boost Plus twice daily and follow per protocol.

## 2021-11-08 NOTE — DISCHARGE SUMMARY
[facility] Medicine Services  DISCHARGE SUMMARY       Date of Admission: 11/7/2021  Date of Discharge:  11/8/2021  Primary Care Physician: Provider, No Known    Discharge Diagnoses:  Active Hospital Problems    Diagnosis    • **Chest pain    • Essential hypertension, benign    • Constipation    • Pulmonary emphysema (HCC)          Presenting Problem/History of Present Illness:  Chest pain, unspecified type [R07.9]     Chief Complaint on Day of Discharge:   Chest pain    History of Present Illness on Day of Discharge:   The patient has been chest pain-free since arriving to the floor.  Stress test was performed this morning is low risk for ischemia.  She is stable for discharge home and should follow up with her primary care physician next week to discuss chest discomfort if it becomes persistent.    Hospital Course   Patient is a 66-year-old white female past medical history of hypertension COPD recently noted on imaging who continues to smoke.  She presents with a 2-day history of chest pain seems to be worse with inspiration she is pointing to her left upper chest wall area as well.  She denies fevers chills she denies anything that improves it or worsens it.  She presents to the ER EKGs are unremarkable for acute changes cardiac markers are negative.  CTA of the chest shows emphysematous pattern no no infiltrates she does have evidence of vascular disease.  CT of the abdomen was also obtained and shows significant constipation.  Patient denies any problems with this she is not the best historian.  Given these problems we have been asked to admit her for chest pain.  1.  Chest pain admit patient rule out for myocardial infarction with serial enzymes and EKGs.  Will monitor on telemetry obtain a fasting lipid panel in the morning start her on aspirin.  If enzymes are negative and EKGs are unchanged obtain a stress echo in the morning.  I believe this is probably more respiratory  in nature please see next number for details.  2.  Emphysema I believe this is probably part of her problem possibly some chest wall pain was started on some Solu-Medrol and duo nebs.  She does not have a fever white counts I do not think she needs antibiotics at this time.  Also encourage smoking cessation.  3.  Constipation we will give her a bottle of mag citrate start MiraLAX daily.  4.  Hypertension need to find out what dose of her lisinopril she is on and resume it.  Monitor BP as needed labetalol.  5.  Tobacco abuse encourage smoking cessation we will put her on nicotine patch.  6.  Medications reviewed and resumed as appropriate.      Pertinent Test Results:   Lab Results (last 7 days)     Procedure Component Value Units Date/Time    Comprehensive Metabolic Panel [[id number]]  (Abnormal) Collected: 11/08/21 0506    Specimen: Blood Updated: 11/08/21 0642     Glucose 90 mg/dL      BUN 18 mg/dL      Creatinine 0.61 mg/dL      Sodium 139 mmol/L      Potassium 4.0 mmol/L      Comment: Slight hemolysis detected by analyzer. Results may be affected.        Chloride 105 mmol/L      CO2 22.0 mmol/L      Calcium 9.1 mg/dL      Total Protein 6.3 g/dL      Albumin 3.90 g/dL      ALT (SGPT) 17 U/L      AST (SGOT) 20 U/L      Alkaline Phosphatase 81 U/L      Total Bilirubin 0.3 mg/dL      eGFR Non African Amer 98 mL/min/1.73      Globulin 2.4 gm/dL      A/G Ratio 1.6 g/dL      BUN/Creatinine Ratio 29.5     Anion Gap 12.0 mmol/L     Narrative:      GFR Normal >60  Chronic Kidney Disease <60  Kidney Failure <15      Magnesium [[id number]]  (Normal) Collected: 11/08/21 0506    Specimen: Blood Updated: 11/08/21 0642     Magnesium 1.8 mg/dL     Lipid Panel [[id number]]  (Abnormal) Collected: 11/08/21 0517    Specimen: Blood Updated: 11/08/21 0638     Total Cholesterol 172 mg/dL      Triglycerides 70 mg/dL      HDL Cholesterol 83 mg/dL      LDL Cholesterol  76 mg/dL      VLDL Cholesterol 13 mg/dL      LDL/HDL Ratio 0.90     Narrative:      Cholesterol Reference Ranges  (U.S. Department of Health and Human Services ATP III Classifications)    Desirable          <200 mg/dL  Borderline High    200-239 mg/dL  High Risk          >240 mg/dL      Triglyceride Reference Ranges  (U.S. Department of Health and Human Services ATP III Classifications)    Normal           <150 mg/dL  Borderline High  150-199 mg/dL  High             200-499 mg/dL  Very High        >500 mg/dL    HDL Reference Ranges  (U.S. Department of Health and Human Services ATP III Classifcations)    Low     <40 mg/dl (major risk factor for CHD)  High    >60 mg/dl ('negative' risk factor for CHD)        LDL Reference Ranges  (U.S. Department of Health and Human Services ATP III Classifcations)    Optimal          <100 mg/dL  Near Optimal     100-129 mg/dL  Borderline High  130-159 mg/dL  High             160-189 mg/dL  Very High        >189 mg/dL    Troponin [[id number]]  (Normal) Collected: 11/08/21 0517    Specimen: Blood Updated: 11/08/21 0638     Troponin T <0.010 ng/mL     Narrative:      Troponin T Reference Range:  <= 0.03 ng/mL-   Negative for AMI  >0.03 ng/mL-     Abnormal for myocardial necrosis.  Clinicians would have to utilize clinical acumen, EKG, Troponin and serial changes to determine if it is an Acute Myocardial Infarction or myocardial injury due to an underlying chronic condition.       Results may be falsely decreased if patient taking Biotin.      Troponin [[id number]]  (Normal) Collected: 11/07/21 2343    Specimen: Blood Updated: 11/08/21 0008     Troponin T <0.010 ng/mL     Narrative:      Troponin T Reference Range:  <= 0.03 ng/mL-   Negative for AMI  >0.03 ng/mL-     Abnormal for myocardial necrosis.  Clinicians would have to utilize clinical acumen, EKG, Troponin and serial changes to determine if it is an Acute Myocardial Infarction or myocardial injury due to an underlying chronic condition.       Results may be falsely decreased if patient taking  Biotin.      [place] Draw [[id number]] Collected: 11/07/21 2127    Specimen: Blood Updated: 11/07/21 2231    Narrative:      The following orders were created for panel order [place] Draw.  Procedure                               Abnormality         Status                     ---------                               -----------         ------                     Green Top (Gel)[[id number]]                                   Final result               Lavender Top[[id number]]                                      Final result               Red Top[[id number]]                                           Final result               Light Blue Top[[id number]]                                    Final result                 Please view results for these tests on the individual orders.    Green Top (Gel) [[id number]] Collected: 11/07/21 2127    Specimen: Blood Updated: 11/07/21 2231     Extra Tube Hold for add-ons.     Comment: Auto resulted.       Lavender Top [[id number]] Collected: 11/07/21 2127    Specimen: Blood Updated: 11/07/21 2231     Extra Tube hold for add-on     Comment: Auto resulted       Red Top [[id number]] Collected: 11/07/21 2127    Specimen: Blood Updated: 11/07/21 2231     Extra Tube Hold for add-ons.     Comment: Auto resulted.       Light Blue Top [[id number]] Collected: 11/07/21 2127    Specimen: Blood Updated: 11/07/21 2231     Extra Tube hold for add-on     Comment: Auto resulted       COVID PRE-OP / PRE-PROCEDURE SCREENING ORDER (NO ISOLATION) - Swab, Nasal Cavity [[id number]]  (Normal) Collected: 11/07/21 2130    Specimen: Swab from Nasal Cavity Updated: 11/07/21 2207    Narrative:      The following orders were created for panel order COVID PRE-OP / PRE-PROCEDURE SCREENING ORDER (NO ISOLATION) - Swab, Nasal Cavity.  Procedure                               Abnormality         Status                     ---------                               -----------         ------                     COVID-19,[facility]  IN-KAT[facility]...[[id number]]  Normal              Final result                 Please view results for these tests on the individual orders.    COVID-19,[facility] IN-HOUSE,Nasal Swab No Transport Media 3-4 HR TAT - Swab, Nasal Cavity [[id number]]  (Normal) Collected: 11/07/21 2130    Specimen: Swab from Nasal Cavity Updated: 11/07/21 2207     COVID19 Not Detected    Narrative:      Fact sheet for providers: https://www.fda.gov/media/117663/download     Fact sheet for patients: https://www.fda.gov/media/911342/download    Test performed by PCR.    Consider negative results in combination with clinical observations, patient history, and epidemiological information.    Comprehensive Metabolic Panel [[id number]]  (Abnormal) Collected: 11/07/21 2127    Specimen: Blood Updated: 11/07/21 2152     Glucose 67 mg/dL      BUN 25 mg/dL      Creatinine 0.91 mg/dL      Sodium 143 mmol/L      Potassium 4.3 mmol/L      Chloride 106 mmol/L      CO2 28.0 mmol/L      Calcium 9.6 mg/dL      Total Protein 6.5 g/dL      Albumin 4.20 g/dL      ALT (SGPT) 17 U/L      AST (SGOT) 17 U/L      Alkaline Phosphatase 91 U/L      Total Bilirubin 0.2 mg/dL      eGFR Non African Amer 62 mL/min/1.73      Globulin 2.3 gm/dL      A/G Ratio 1.8 g/dL      BUN/Creatinine Ratio 27.5     Anion Gap 9.0 mmol/L     Narrative:      GFR Normal >60  Chronic Kidney Disease <60  Kidney Failure <15      Troponin [[id number]]  (Normal) Collected: 11/07/21 2127    Specimen: Blood Updated: 11/07/21 2150     Troponin T <0.010 ng/mL     Narrative:      Troponin T Reference Range:  <= 0.03 ng/mL-   Negative for AMI  >0.03 ng/mL-     Abnormal for myocardial necrosis.  Clinicians would have to utilize clinical acumen, EKG, Troponin and serial changes to determine if it is an Acute Myocardial Infarction or myocardial injury due to an underlying chronic condition.       Results may be falsely decreased if patient taking Biotin.      Lipase [[id number]]  (Normal) Collected: 11/07/21 2127     Specimen: Blood Updated: 11/07/21 2147     Lipase 49 U/L     Protime-INR [[id number]]  (Normal) Collected: 11/07/21 2127    Specimen: Blood Updated: 11/07/21 2143     Protime 13.2 Seconds      INR 1.04    aPTT [[id number]]  (Normal) Collected: 11/07/21 2127    Specimen: Blood Updated: 11/07/21 2143     PTT 29.6 seconds     CBC & Differential [[id number]]  (Normal) Collected: 11/07/21 2127    Specimen: Blood Updated: 11/07/21 2133    Narrative:      The following orders were created for panel order CBC & Differential.  Procedure                               Abnormality         Status                     ---------                               -----------         ------                     CBC Auto Differential[[id number]]         Normal              Final result                 Please view results for these tests on the individual orders.    CBC Auto Differential [[id number]]  (Normal) Collected: 11/07/21 2127    Specimen: Blood Updated: 11/07/21 2133     WBC 5.87 10*3/mm3      RBC 4.67 10*6/mm3      Hemoglobin 13.1 g/dL      Hematocrit 40.3 %      MCV 86.3 fL      MCH 28.1 pg      MCHC 32.5 g/dL      RDW 13.4 %      RDW-SD 41.9 fl      MPV 10.1 fL      Platelets 225 10*3/mm3      Neutrophil % 45.1 %      Lymphocyte % 42.6 %      Monocyte % 8.9 %      Eosinophil % 2.2 %      Basophil % 1.0 %      Immature Grans % 0.2 %      Neutrophils, Absolute 2.65 10*3/mm3      Lymphocytes, Absolute 2.50 10*3/mm3      Monocytes, Absolute 0.52 10*3/mm3      Eosinophils, Absolute 0.13 10*3/mm3      Basophils, Absolute 0.06 10*3/mm3      Immature Grans, Absolute 0.01 10*3/mm3      nRBC 0.0 /100 WBC         Imaging Results (Last 7 Days)     Procedure Component Value Units Date/Time    CT Abdomen Pelvis With Contrast [[id number]] Collected: 11/08/21 0846     Updated: 11/08/21 0853    Narrative:      EXAMINATION:   CT ABDOMEN PELVIS W CONTRAST-  11/8/2021 8:46 AM CST     HISTORY: CT ABDOMEN AND PELVIS WITH CONTRAST 11/8/2021 12:25 AM CST      HISTORY: Epigastric pain     COMPARISON: None.      DLP: 145 mGy cm Automated exposure control was also utilized to decrease  patient radiation dose.     TECHNIQUE: Following the intravenous administration of contrast, helical  CT tomographic images of the abdomen and pelvis were acquired. Coronal  reformatted images were also provided for review.      FINDINGS:   The lung bases and base of the heart are unremarkable.      LIVER: No focal liver lesion. The hepatic vasculature is patent.      BILIARY SYSTEM: Surgical clips are present from previous  cholecystectomy..      PANCREAS: No focal pancreatic lesion.      SPLEEN: Unremarkable.      KIDNEYS AND ADRENALS: Bilateral kidneys and adrenal glands are  unremarkable. The ureters are decompressed and normal in appearance.     RETROPERITONEUM: No mass, lymphadenopathy or hemorrhage.      GI TRACT: No evidence of obstruction or bowel wall thickening. The  appendix is not identified. Moderate amount stool is noted throughout  the colon..     OTHER: There is no mesenteric mass, lymphadenopathy or fluid collection.  Vascular calcifications present abdominal aorta and iliac arteries.. The  osseous structures and soft tissues demonstrate no worrisome lesions.     PELVIS: The uterus is visualized.. The urinary bladder is normal in  appearance.       Impression:      1. Moderate amount stool is noted in the colon consistent with  constipation..         This report was finalized on 11/08/2021 08:50 by Dr. German Barr MD.    CT Chest With Contrast Diagnostic [[id number]] Collected: 11/08/21 0731     Updated: 11/08/21 0737    Narrative:      EXAMINATION:   CT CHEST W CONTRAST DIAGNOSTIC-  11/8/2021 7:31 AM CST     HISTORY: CT CHEST with contrast 11/8/2021 12:17 AM CST     HISTORY: Chest pain hypertension     COMPARISON: None      DLP: 4 9 mGy cm     TECHNIQUE: Serial helical tomographic images of the chest were acquired  following the infusion of Isovue contrast  intravenously. Bone and soft  tissue algorithms were provided.       FINDINGS:   Neck base: The imaged portion of the neck and thyroid gland is  unremarkable.      Lungs: Emphysematous changes noted throughout the pulmonary parenchyma.  Blebs are present bilaterally. Chronic interstitial changes noted.. No  pleural effusion is present. The trachea and bronchial tree are patent.      Heart: The heart is normal in size. Calcifications noted associated with  coronary arteries..      Great vessels: The great vessels are normal in caliber and are patent.  The pulmonary arteries are patent within limits of this study and are  normal in caliber.     Lymph nodes: No significant hilar, mediastinal or axillary  lymphadenopathy is appreciated.     Skeletal and soft tissues: The osseous structures of the thorax and  surrounding soft tissues are unremarkable.     Upper abdomen: The imaged portion of the upper abdomen demonstrates no  acute process.       Impression:      1. Emphysema with no acute cardiopulmonary process.     These images initially reviewed by stat rad at 2:39 AM.        This report was finalized on 11/08/2021 07:34 by Dr. German Barr MD.    XR Chest 1 View [[id number]] Collected: 11/07/21 2139     Updated: 11/07/21 2143    Narrative:      EXAMINATION: XR CHEST 1 VW-     11/7/2021 9:10 AM CST     HISTORY: Chest Pain Triage Protocol     1 view chest x-ray.     Heart size is normal.  The mediastinum is within normal limits.      The lungs are normally expanded with no pneumonia or pneumothorax.  Mild chronic appearing interstitial disease with a few calcified  granulomas.  No congestive failure changes.                                                                       Impression:      1. No acute disease.        This report was finalized on 11/07/2021 21:40 by Dr. Thony Bearden MD.            Condition on Discharge:    Stable    Physical Exam on Discharge:  /76 (BP Location: Left arm, Patient Position:  "Lying)   Pulse 73   Temp 98.5 °F (36.9 °C) (Oral)   Resp 16   Ht 172.7 cm (68\")   Wt 59.1 kg (130 lb 3 oz)   SpO2 95%   BMI 19.80 kg/m²   Physical Exam     Gen.:  Well-nourished well-developed white female in no acute distress  HEENT: Atraumatic, normocephalic.  Pupils equal, round, and reactive to light. Extraocular movements intact.  Sclerae anicteric.  External ears negative.  Mucous membranes moist.  Neck is supple without lymphadenopathy.  No JVD is noted.  No carotid bruits are auscultated.  Oropharynx is without erythema or exudate.   Chest: Clear to auscultation and percussion.  Questionable tenderness to chest wall with palpation  CV: Regular rate and rhythm.  Normal S1-S2.  No gallops, murmurs. or rubs.  Abdomen: Soft, nontender, nondistended.  Active bowel sounds,  No hepatosplenomegaly.  No masses.  No hernias.  Extremities: No clubbing, edema, or cyanosis.  Capillary refill is normal.  Pulses are 2+ and symmetric at radial and dorsalis pedis.  Posterior tibial pulses are intact and 2+ palpable.  Neuro: Patient is awake, alert, and oriented ×3.  Cranial nerves II through XII are grossly intact.  Motor is 5 out of 5 bilaterally.  DTRs are 2+ and symmetric bilaterally. Sensory exam is nonfocal  Skin: Warm, dry, and intact.  No evidence of breakdown.  Sensorium: Normal thought and affect      Discharge Disposition:  Home or Self Care    Discharge Medications:     Discharge Medications      New Medications      Instructions Start Date   predniSONE 5 MG (21) tablet therapy pack dose pack   5 mg, Oral, Take As Directed, Take as directed on package instructions.         Continue These Medications      Instructions Start Date   lisinopril 10 MG tablet  Commonly known as: PRINIVIL,ZESTRIL   10 mg, Oral, Daily             Discharge Diet:   Diet Instructions     Diet: Regular; Thin      Discharge Diet: Regular    Fluid Consistency: Thin          Discharge Care Plan / Instructions:   Discharge " home    Activity at Discharge:   Activity Instructions     Activity as Tolerated            Follow-up Appointments:  Follow-up with Dr. Nancy Hardwick next week to establish care       Electronically signed by Burke Motta DO, 11/08/21, 15:51 CST.    Time: Discharge Less than 30 min    Part of this note may be an electronic transcription/translation of spoken language to printed text using the Dragon Dictation system.

## 2021-11-08 NOTE — H&P
[facility] Medicine Services  HISTORY AND PHYSICAL    Date of Admission: 11/7/2021  Primary Care Physician: Provider, No Known    Subjective     Chief Complaint: Chest pain    History of Present Illness  Patient is a 66-year-old white female past medical history of hypertension COPD recently noted on imaging who continues to smoke.  She presents with a 2-day history of chest pain seems to be worse with inspiration she is pointing to her left upper chest wall area as well.  She denies fevers chills she denies anything that improves it or worsens it.  She presents to the ER EKGs are unremarkable for acute changes cardiac markers are negative.  CTA of the chest shows emphysematous pattern no no infiltrates she does have evidence of vascular disease.  CT of the abdomen was also obtained and shows significant constipation.  Patient denies any problems with this she is not the best historian.  Given these problems we have been asked to admit her for chest pain.        Review of Systems   14 point review of systems negative except for as per HPI    Otherwise complete ROS reviewed and negative except as mentioned in the HPI.    Past Medical History:   Past Medical History:   Diagnosis Date   • ADHD (attention deficit hyperactivity disorder)    • COPD (chronic obstructive pulmonary disease) (HCC)    • Hypertension      Past Surgical History:  Past Surgical History:   Procedure Laterality Date   • APPENDECTOMY     • CHOLECYSTECTOMY     • MANDIBLE SURGERY     • TONSILLECTOMY       Social History:  reports that she has been smoking cigarettes. She has been smoking about 0.50 packs per day. She has never used smokeless tobacco. She reports previous alcohol use. Drug use questions deferred to the physician.    Family History: family history includes No Known Problems in her father and mother.       Allergies:  No Known Allergies    Medications:  Prior to Admission medications    Not on File  "  Lisinopril unknown dose  I have utilized all available immediate resources to obtain, update, and review the patient's current medications.    Objective     Vital Signs: /78 (BP Location: Left arm, Patient Position: Lying)   Pulse 68   Temp 97.4 °F (36.3 °C) (Oral)   Resp 16   Ht 172.7 cm (67.99\")   Wt 59.1 kg (130 lb 3 oz)   SpO2 95%   BMI 19.80 kg/m²   Physical Exam   Gen.:  Well-nourished well-developed white female in no acute distress  HEENT: Atraumatic, normocephalic.  Pupils equal, round, and reactive to light. Extraocular movements intact.  Sclerae anicteric.  External ears negative.  Mucous membranes moist.  Neck is supple without lymphadenopathy.  No JVD is noted.  No carotid bruits are auscultated.  Oropharynx is without erythema or exudate.   Chest: Clear to auscultation and percussion.  Questionable tenderness to chest wall with palpation  CV: Regular rate and rhythm.  Normal S1-S2.  No gallops, murmurs. or rubs.  Abdomen: Soft, nontender, nondistended.  Active bowel sounds,  No hepatosplenomegaly.  No masses.  No hernias.  Extremities: No clubbing, edema, or cyanosis.  Capillary refill is normal.  Pulses are 2+ and symmetric at radial and dorsalis pedis.  Posterior tibial pulses are intact and 2+ palpable.  Neuro: Patient is awake, alert, and oriented ×3.  Cranial nerves II through XII are grossly intact.  Motor is 5 out of 5 bilaterally.  DTRs are 2+ and symmetric bilaterally. Sensory exam is nonfocal  Skin: Warm, dry, and intact.  No evidence of breakdown.  Sensorium: Normal thought and affect    Nursing notes and vital signs reviewed        Results Reviewed:  Lab Results (last 24 hours)     Procedure Component Value Units Date/Time    Troponin [[id number]]  (Normal) Collected: 11/07/21 2343    Specimen: Blood Updated: 11/08/21 0008     Troponin T <0.010 ng/mL     Narrative:      Troponin T Reference Range:  <= 0.03 ng/mL-   Negative for AMI  >0.03 ng/mL-     Abnormal for myocardial " necrosis.  Clinicians would have to utilize clinical acumen, EKG, Troponin and serial changes to determine if it is an Acute Myocardial Infarction or myocardial injury due to an underlying chronic condition.       Results may be falsely decreased if patient taking Biotin.      [place] Draw [[id number]] Collected: 11/07/21 2127    Specimen: Blood Updated: 11/07/21 2231    Narrative:      The following orders were created for panel order [place] Draw.  Procedure                               Abnormality         Status                     ---------                               -----------         ------                     Green Top (Gel)[[id number]]                                   Final result               Lavender Top[[id number]]                                      Final result               Red Top[[id number]]                                           Final result               Light Blue Top[[id number]]                                    Final result                 Please view results for these tests on the individual orders.    Green Top (Gel) [[id number]] Collected: 11/07/21 2127    Specimen: Blood Updated: 11/07/21 2231     Extra Tube Hold for add-ons.     Comment: Auto resulted.       Lavender Top [[id number]] Collected: 11/07/21 2127    Specimen: Blood Updated: 11/07/21 2231     Extra Tube hold for add-on     Comment: Auto resulted       Red Top [[id number]] Collected: 11/07/21 2127    Specimen: Blood Updated: 11/07/21 2231     Extra Tube Hold for add-ons.     Comment: Auto resulted.       Light Blue Top [[id number]] Collected: 11/07/21 2127    Specimen: Blood Updated: 11/07/21 2231     Extra Tube hold for add-on     Comment: Auto resulted       COVID PRE-OP / PRE-PROCEDURE SCREENING ORDER (NO ISOLATION) - Swab, Nasal Cavity [[id number]]  (Normal) Collected: 11/07/21 2130    Specimen: Swab from Nasal Cavity Updated: 11/07/21 2207    Narrative:      The following orders were created for panel order COVID PRE-OP /  PRE-PROCEDURE SCREENING ORDER (NO ISOLATION) - Swab, Nasal Cavity.  Procedure                               Abnormality         Status                     ---------                               -----------         ------                     COVID-19,[facility] [employer] IN-KATJA...[[id number]]  Normal              Final result                 Please view results for these tests on the individual orders.    COVID-19,[facility] [employer] IN-HOUSE,Nasal Swab No Transport Media 3-4 HR TAT - Swab, Nasal Cavity [[id number]]  (Normal) Collected: 11/07/21 2130    Specimen: Swab from Nasal Cavity Updated: 11/07/21 2207     COVID19 Not Detected    Narrative:      Fact sheet for providers: https://www.fda.gov/media/757318/download     Fact sheet for patients: https://www.fda.gov/media/946716/download    Test performed by PCR.    Consider negative results in combination with clinical observations, patient history, and epidemiological information.    Comprehensive Metabolic Panel [[id number]]  (Abnormal) Collected: 11/07/21 2127    Specimen: Blood Updated: 11/07/21 2152     Glucose 67 mg/dL      BUN 25 mg/dL      Creatinine 0.91 mg/dL      Sodium 143 mmol/L      Potassium 4.3 mmol/L      Chloride 106 mmol/L      CO2 28.0 mmol/L      Calcium 9.6 mg/dL      Total Protein 6.5 g/dL      Albumin 4.20 g/dL      ALT (SGPT) 17 U/L      AST (SGOT) 17 U/L      Alkaline Phosphatase 91 U/L      Total Bilirubin 0.2 mg/dL      eGFR Non African Amer 62 mL/min/1.73      Globulin 2.3 gm/dL      A/G Ratio 1.8 g/dL      BUN/Creatinine Ratio 27.5     Anion Gap 9.0 mmol/L     Narrative:      GFR Normal >60  Chronic Kidney Disease <60  Kidney Failure <15      Troponin [[id number]]  (Normal) Collected: 11/07/21 2127    Specimen: Blood Updated: 11/07/21 2150     Troponin T <0.010 ng/mL     Narrative:      Troponin T Reference Range:  <= 0.03 ng/mL-   Negative for AMI  >0.03 ng/mL-     Abnormal for myocardial necrosis.  Clinicians would have to utilize clinical acumen, EKG, Troponin  and serial changes to determine if it is an Acute Myocardial Infarction or myocardial injury due to an underlying chronic condition.       Results may be falsely decreased if patient taking Biotin.      Lipase [[id number]]  (Normal) Collected: 11/07/21 2127    Specimen: Blood Updated: 11/07/21 2147     Lipase 49 U/L     Protime-INR [[id number]]  (Normal) Collected: 11/07/21 2127    Specimen: Blood Updated: 11/07/21 2143     Protime 13.2 Seconds      INR 1.04    aPTT [[id number]]  (Normal) Collected: 11/07/21 2127    Specimen: Blood Updated: 11/07/21 2143     PTT 29.6 seconds     CBC & Differential [[id number]]  (Normal) Collected: 11/07/21 2127    Specimen: Blood Updated: 11/07/21 2133    Narrative:      The following orders were created for panel order CBC & Differential.  Procedure                               Abnormality         Status                     ---------                               -----------         ------                     CBC Auto Differential[[id number]]         Normal              Final result                 Please view results for these tests on the individual orders.    CBC Auto Differential [[id number]]  (Normal) Collected: 11/07/21 2127    Specimen: Blood Updated: 11/07/21 2133     WBC 5.87 10*3/mm3      RBC 4.67 10*6/mm3      Hemoglobin 13.1 g/dL      Hematocrit 40.3 %      MCV 86.3 fL      MCH 28.1 pg      MCHC 32.5 g/dL      RDW 13.4 %      RDW-SD 41.9 fl      MPV 10.1 fL      Platelets 225 10*3/mm3      Neutrophil % 45.1 %      Lymphocyte % 42.6 %      Monocyte % 8.9 %      Eosinophil % 2.2 %      Basophil % 1.0 %      Immature Grans % 0.2 %      Neutrophils, Absolute 2.65 10*3/mm3      Lymphocytes, Absolute 2.50 10*3/mm3      Monocytes, Absolute 0.52 10*3/mm3      Eosinophils, Absolute 0.13 10*3/mm3      Basophils, Absolute 0.06 10*3/mm3      Immature Grans, Absolute 0.01 10*3/mm3      nRBC 0.0 /100 WBC         Imaging Results (Last 24 Hours)     Procedure Component Value Units Date/Time     CT Abdomen Pelvis With Contrast [[id number]] Resulted: 11/08/21 0025     Updated: 11/08/21 0031    CT Chest With Contrast Diagnostic [[id number]] Resulted: 11/08/21 0020     Updated: 11/08/21 0030    XR Chest 1 View [[id number]] Collected: 11/07/21 2139     Updated: 11/07/21 2143    Narrative:      EXAMINATION: XR CHEST 1 VW-     11/7/2021 9:10 AM CST     HISTORY: Chest Pain Triage Protocol     1 view chest x-ray.     Heart size is normal.  The mediastinum is within normal limits.      The lungs are normally expanded with no pneumonia or pneumothorax.  Mild chronic appearing interstitial disease with a few calcified  granulomas.  No congestive failure changes.                                                                       Impression:      1. No acute disease.        This report was finalized on 11/07/2021 21:40 by Dr. Thony Bearden MD.        I have personally reviewed and interpreted the radiology studies and ECG obtained at time of admission.     Assessment / Plan     Assessment:   Active Hospital Problems    Diagnosis    • **Chest pain    • Essential hypertension, benign    • Constipation    • Pulmonary emphysema (HCC)    1.  Chest pain admit patient rule out for myocardial infarction with serial enzymes and EKGs.  Will monitor on telemetry obtain a fasting lipid panel in the morning start her on aspirin.  If enzymes are negative and EKGs are unchanged obtain a stress echo in the morning.  I believe this is probably more respiratory in nature please see next number for details.  2.  Emphysema I believe this is probably part of her problem possibly some chest wall pain was started on some Solu-Medrol and duo nebs.  She does not have a fever white counts I do not think she needs antibiotics at this time.  Also encourage smoking cessation.  3.  Constipation we will give her a bottle of mag citrate start MiraLAX daily.  4.  Hypertension need to find out what dose of her lisinopril she is on and resume it.  Monitor  BP as needed labetalol.  5.  Tobacco abuse encourage smoking cessation we will put her on nicotine patch.  6.  Medications reviewed and resumed as appropriate.    Patient seen after midnight       Code Status/Advanced Care Plan: DNR/DNI    The patient's surrogate decision maker is patient's son.     I discussed my findings and recommendations with the patient.    Estimated length of stay is 1-2 nights.     The patient was seen and examined by me on November 8, 2021 at 2:38 AM.    Electronically signed by Duong Holguin MD, 11/08/21, 05:40 CST.

## 2021-11-08 NOTE — ED PROVIDER NOTES
Subjective   Patient is a difficult historian.  She states that she has been having some left-sided chest pain and pressure for the past few days.  States that she has not tried anything for this.  States that she supposed be taking blood pressure medication but she does not know what she is supposed to be taking.  States that she also has some photophobia that she has had for a very long time and so that is why she does not open her eyes.  States that she is not having any numbness, tingling, abdominal pain, dysuria, hematuria.  States that she has not had any fevers or chills.  States that she did not fall or hit her chest recently.  Denies any recent weight loss or night sweats.          Review of Systems   All other systems reviewed and are negative.      Past Medical History:   Diagnosis Date   • ADHD (attention deficit hyperactivity disorder)    • COPD (chronic obstructive pulmonary disease) (HCC)    • Hypertension        No Known Allergies    Past Surgical History:   Procedure Laterality Date   • APPENDECTOMY     • CHOLECYSTECTOMY     • MANDIBLE SURGERY     • TONSILLECTOMY         Family History   Problem Relation Age of Onset   • No Known Problems Mother    • No Known Problems Father        Social History     Socioeconomic History   • Marital status: Single   Tobacco Use   • Smoking status: Current Every Day Smoker     Packs/day: 0.50     Types: Cigarettes   • Smokeless tobacco: Never Used   Substance and Sexual Activity   • Alcohol use: Not Currently   • Drug use: Defer   • Sexual activity: Defer           Objective   Physical Exam  Vitals and nursing note reviewed.   Constitutional:       General: She is not in acute distress.     Appearance: She is not ill-appearing, toxic-appearing or diaphoretic.   HENT:      Head: Normocephalic and atraumatic.      Nose: Nose normal.   Eyes:      General:         Right eye: No discharge.         Left eye: No discharge.      Extraocular Movements: Extraocular movements  intact.      Conjunctiva/sclera: Conjunctivae normal.      Pupils: Pupils are equal, round, and reactive to light.   Cardiovascular:      Rate and Rhythm: Normal rate and regular rhythm.      Pulses: Normal pulses.   Pulmonary:      Effort: Pulmonary effort is normal. No respiratory distress.      Breath sounds: No stridor. No rhonchi.   Abdominal:      General: Abdomen is flat.      Palpations: Abdomen is soft.      Tenderness: There is no abdominal tenderness. There is no right CVA tenderness, left CVA tenderness, guarding or rebound.   Musculoskeletal:         General: Normal range of motion.      Cervical back: Normal range of motion and neck supple. No rigidity or tenderness.   Skin:     General: Skin is warm.      Capillary Refill: Capillary refill takes less than 2 seconds.   Neurological:      General: No focal deficit present.      Mental Status: She is alert and oriented to person, place, and time.      Cranial Nerves: No cranial nerve deficit.      Sensory: No sensory deficit.      Motor: No weakness.   Psychiatric:         Mood and Affect: Mood normal.         Behavior: Behavior normal.         Thought Content: Thought content normal.         Judgment: Judgment normal.         Procedures           ED Course                                           MDM  Number of Diagnoses or Management Options  Chest pain, unspecified type  Constipation, unspecified constipation type  Diagnosis management comments: This is a 66yoF presenting with chest pain. Patient arrived hemodynamically stable, although hypertensive, and was afebrile. Patient was placed on the monitor and IV access was established. Patient has received a total 324mg of aspirin since the onset of chest pain. Differentials include, but are not limited to ACS, PE, musculoskeletal pain, infection. HEART score is 4. Plan to obtain C, CMP, EKG, troponin x2, CT scan of the chest and abdomen pelvis as she does have some epigastric pain, control pain,  administer BP medication, and reassess. EKG was obtained and did not reveal any malignant/unstable dysrhythmia or any acute ST elevations.     Presentation not consistent with other acute, emergent causes of chest pain at this time. Low suspicion for pneumothorax as the patient is saturating well and has no radiographic evidence of a pneumothorax. Low suspicion for dissection there is no widened mediastinum, hypotension, pulse deficits, and no tearing back/abdominal pain. Low suspicion for tamponade as there is no JVD, muffled heart sounds, electrical alternans on EKG, and no hypotension. Low suspicion for pericarditis as there is no diffuse ST elevation or VT depression and the patient is afebrile. Low suspicion for myocarditis as there is no persistent tachycardia, recent viral illness, hypotension, or evidence of LVH. Low suspicion for acute PE as low risk per WELLS criteria and no evidence of DVT such as calf swelling, tenderness, palpable tortuous lower extremity vein, or marla's sign.     The workup was reviewed and found to have evidence of labile blood pressures that required multiple blood pressure medication dosing.  She is feeling better from a chest pain perspective.  She still having some intermittent chest pressure on the left side.  States that her headache is gotten better.  Given her chest pain and elevated heart score she will require further work-up and possible stress test in the morning.  She is okay with this plan. I reassessed the patient and discussed the findings of the work up so far. I explained my impression of the workup to her and addressed all of her questions regarding the emergency department evaluation, diagnosis, and treatment plan in plain and simple language that she was able to understand.     I told her that the next step in treatment would be admission to the hospital for further workup and care. She voiced agreement with the plan of care so far and had no further questions. I  told her that there is always some diagnostic uncertainty in the ER and that her work up, current physical exam, and even her current presentation may not always reveal other underlying conditions. I also went over the fact that her condition may change or worsen while being in the hospital. I told her that they may even find more things that require treatment or follow-up. She expressed understanding and agreed that there are reasonable limitations with the practice of emergency medicine.    The hospitalist service was consulted for evaluation and admission. The hospitalist service assumed primary care of the patient and admitted the patient in stable condition.            Amount and/or Complexity of Data Reviewed  Clinical lab tests: reviewed and ordered  Tests in the radiology section of CPT®: reviewed and ordered  Tests in the medicine section of CPT®: reviewed    Risk of Complications, Morbidity, and/or Mortality  Presenting problems: moderate  Diagnostic procedures: moderate  Management options: moderate        Final diagnoses:   Chest pain, unspecified type   Constipation, unspecified constipation type       ED Disposition  ED Disposition     ED Disposition Condition Comment    Decision to Admit  Level of Care: Med/Surg [1]   Diagnosis: Chest pain, unspecified type [[id number]]   Admitting Physician: ARTUR CORRAL [[id number]]   Attending Physician: ARTUR CORRAL [[id number]]            No follow-up provider specified.       Medication List      No changes were made to your prescriptions during this visit.          Jessie Moore MD  11/08/21 [id number]

## 2021-11-08 NOTE — PLAN OF CARE
"Goal Outcome Evaluation:  Plan of Care Reviewed With: patient        Progress: no change  Outcome Summary: Patient c/o chest pain/tight 5/10. Patient to have echo today.  Patient states she did not take her home BP medication because  she \"forgot\". BP elevated, meds given in ED to control. CM negative x 2.  Call light in reach.  Patient very quiet, soft spoken.  C/o light sensitivity.  "

## 2021-11-09 ENCOUNTER — READMISSION MANAGEMENT (OUTPATIENT)
Dept: CALL CENTER | Facility: HOSPITAL | Age: 66
End: 2021-11-09

## 2021-11-09 ENCOUNTER — TRANSITIONAL CARE MANAGEMENT TELEPHONE ENCOUNTER (OUTPATIENT)
Dept: CALL CENTER | Facility: HOSPITAL | Age: 66
End: 2021-11-09

## 2021-11-09 LAB
QT INTERVAL: 426 MS
QTC INTERVAL: 450 MS

## 2021-11-09 NOTE — OUTREACH NOTE
Call Center TCM Note      Responses   [facility] patient discharged from? [place]   Does the patient have one of the following disease processes/diagnoses(primary or secondary)? COPD/Pneumonia   TCM attempt successful? Yes   Call start time 1522   Call end time 1524   Discharge diagnosis cheset pain,  HTN,  COPD new dx   Meds reviewed with patient/caregiver? Yes   Is the patient having any side effects they believe may be caused by any medication additions or changes? No   Does the patient have all medications ordered at discharge? Yes   Is the patient taking all medications as directed (includes completed medication regime)? Yes   Does the patient have a primary care provider?  Yes   Does the patient have an appointment with their PCP or specialist within 7 days of discharge? Yes   Comments regarding PCP hosp [facility] on 11/15/21 with pcp   Pulse Ox monitoring None   Psychosocial issues? No   Did the patient receive a copy of their discharge instructions? Yes   Nursing interventions Reviewed instructions with patient   What is the patient's perception of their health status since discharge? Improving   Nursing Interventions Nurse provided patient education   If the patient is a current smoker, are they able to teach back resources for cessation? Not a smoker   Is the patient/caregiver able to teach back the hierarchy of who to call/visit for symptoms/problems? PCP, Specialist, Home health nurse, Urgent Care, ED, 911 Yes   Is the patient able to teach back COPD zones? Yes   Nursing interventions Education provided on various zones   Patient reports what zone on this call? Green Zone   Green Zone Reports doing well,  Breathing without shortness of breath,  Sleeping well   Green Zone interventions: Take daily medications   TCM call completed? Yes          Pili De Jesus RN    11/9/2021, 15:24 EST

## 2021-11-09 NOTE — OUTREACH NOTE
Prep Survey      Responses   [facility] patient discharged from? [place]   Is LACE score < 7 ? Yes   Emergency Room discharge w/ pulse ox? No   Eligibility [facility]   Date of Admission 11/07/21   Date of Discharge 11/08/21   Discharge Disposition Home or Self Care   Discharge diagnosis cheset pain,  HTN,  COPD new dx   Does the patient have one of the following disease processes/diagnoses(primary or secondary)? COPD/Pneumonia   Does the patient have Home health ordered? No   Is there a DME ordered? No   Prep survey completed? Yes          Amy Simth RN

## 2021-11-16 ENCOUNTER — TELEPHONE (OUTPATIENT)
Dept: FAMILY MEDICINE CLINIC | Facility: CLINIC | Age: 66
End: 2021-11-16

## 2021-11-16 NOTE — TELEPHONE ENCOUNTER
Called patient to advise of no show and  policy, advised patient and patient refused to reschedule.

## 2025-01-24 ENCOUNTER — APPOINTMENT (OUTPATIENT)
Dept: CT IMAGING | Facility: HOSPITAL | Age: 70
End: 2025-01-24
Payer: MEDICARE

## 2025-01-24 ENCOUNTER — HOSPITAL ENCOUNTER (OUTPATIENT)
Facility: HOSPITAL | Age: 70
Setting detail: OBSERVATION
LOS: 1 days | Discharge: HOME | End: 2025-01-26
Attending: STUDENT IN AN ORGANIZED HEALTH CARE EDUCATION/TRAINING PROGRAM | Admitting: FAMILY MEDICINE
Payer: MEDICARE

## 2025-01-24 ENCOUNTER — APPOINTMENT (OUTPATIENT)
Dept: GENERAL RADIOLOGY | Facility: HOSPITAL | Age: 70
End: 2025-01-24
Payer: MEDICARE

## 2025-01-24 DIAGNOSIS — R07.9 CHEST PAIN, UNSPECIFIED TYPE: Primary | ICD-10-CM

## 2025-01-24 LAB
ALBUMIN SERPL-MCNC: 3.6 G/DL (ref 3.5–5.2)
ALBUMIN/GLOB SERPL: 1.2 G/DL
ALP SERPL-CCNC: 88 U/L (ref 39–117)
ALT SERPL W P-5'-P-CCNC: 12 U/L (ref 1–33)
ANION GAP SERPL CALCULATED.3IONS-SCNC: 11 MMOL/L (ref 5–15)
APTT PPP: 34.3 SECONDS (ref 24.5–36)
AST SERPL-CCNC: 16 U/L (ref 1–32)
BASOPHILS # BLD AUTO: 0.05 10*3/MM3 (ref 0–0.2)
BASOPHILS NFR BLD AUTO: 0.5 % (ref 0–1.5)
BILIRUB SERPL-MCNC: 0.4 MG/DL (ref 0–1.2)
BUN SERPL-MCNC: 16 MG/DL (ref 8–23)
BUN/CREAT SERPL: 26.2 (ref 7–25)
CALCIUM SPEC-SCNC: 8.5 MG/DL (ref 8.6–10.5)
CHLORIDE SERPL-SCNC: 100 MMOL/L (ref 98–107)
CO2 SERPL-SCNC: 23 MMOL/L (ref 22–29)
CREAT SERPL-MCNC: 0.61 MG/DL (ref 0.57–1)
DEPRECATED RDW RBC AUTO: 41.5 FL (ref 37–54)
EGFRCR SERPLBLD CKD-EPI 2021: 97.5 ML/MIN/1.73
EOSINOPHIL # BLD AUTO: 0.06 10*3/MM3 (ref 0–0.4)
EOSINOPHIL NFR BLD AUTO: 0.6 % (ref 0.3–6.2)
ERYTHROCYTE [DISTWIDTH] IN BLOOD BY AUTOMATED COUNT: 14 % (ref 12.3–15.4)
GEN 5 1HR TROPONIN T REFLEX: 18 NG/L
GLOBULIN UR ELPH-MCNC: 2.9 GM/DL
GLUCOSE SERPL-MCNC: 95 MG/DL (ref 65–99)
HCT VFR BLD AUTO: 38.3 % (ref 34–46.6)
HGB BLD-MCNC: 12.7 G/DL (ref 12–15.9)
HOLD SPECIMEN: NORMAL
HOLD SPECIMEN: NORMAL
IMM GRANULOCYTES # BLD AUTO: 0.05 10*3/MM3 (ref 0–0.05)
IMM GRANULOCYTES NFR BLD AUTO: 0.5 % (ref 0–0.5)
INR PPP: 1.02 (ref 0.91–1.09)
LYMPHOCYTES # BLD AUTO: 1.63 10*3/MM3 (ref 0.7–3.1)
LYMPHOCYTES NFR BLD AUTO: 16.6 % (ref 19.6–45.3)
MCH RBC QN AUTO: 27.4 PG (ref 26.6–33)
MCHC RBC AUTO-ENTMCNC: 33.2 G/DL (ref 31.5–35.7)
MCV RBC AUTO: 82.7 FL (ref 79–97)
MONOCYTES # BLD AUTO: 1.13 10*3/MM3 (ref 0.1–0.9)
MONOCYTES NFR BLD AUTO: 11.5 % (ref 5–12)
NEUTROPHILS NFR BLD AUTO: 6.92 10*3/MM3 (ref 1.7–7)
NEUTROPHILS NFR BLD AUTO: 70.3 % (ref 42.7–76)
NRBC BLD AUTO-RTO: 0 /100 WBC (ref 0–0.2)
NT-PROBNP SERPL-MCNC: 1425 PG/ML (ref 0–900)
PLATELET # BLD AUTO: 292 10*3/MM3 (ref 140–450)
PMV BLD AUTO: 9.6 FL (ref 6–12)
POTASSIUM SERPL-SCNC: 3.5 MMOL/L (ref 3.5–5.2)
PROT SERPL-MCNC: 6.5 G/DL (ref 6–8.5)
PROTHROMBIN TIME: 13.9 SECONDS (ref 11.8–14.8)
RBC # BLD AUTO: 4.63 10*6/MM3 (ref 3.77–5.28)
SODIUM SERPL-SCNC: 134 MMOL/L (ref 136–145)
TROPONIN T % DELTA: 13
TROPONIN T NUMERIC DELTA: 2 NG/L
TROPONIN T SERPL HS-MCNC: 16 NG/L
WBC NRBC COR # BLD AUTO: 9.84 10*3/MM3 (ref 3.4–10.8)
WHOLE BLOOD HOLD COAG: NORMAL
WHOLE BLOOD HOLD SPECIMEN: NORMAL

## 2025-01-24 PROCEDURE — 85730 THROMBOPLASTIN TIME PARTIAL: CPT | Performed by: STUDENT IN AN ORGANIZED HEALTH CARE EDUCATION/TRAINING PROGRAM

## 2025-01-24 PROCEDURE — 71275 CT ANGIOGRAPHY CHEST: CPT

## 2025-01-24 PROCEDURE — 93005 ELECTROCARDIOGRAM TRACING: CPT

## 2025-01-24 PROCEDURE — 36415 COLL VENOUS BLD VENIPUNCTURE: CPT

## 2025-01-24 PROCEDURE — 93005 ELECTROCARDIOGRAM TRACING: CPT | Performed by: STUDENT IN AN ORGANIZED HEALTH CARE EDUCATION/TRAINING PROGRAM

## 2025-01-24 PROCEDURE — 83880 ASSAY OF NATRIURETIC PEPTIDE: CPT | Performed by: STUDENT IN AN ORGANIZED HEALTH CARE EDUCATION/TRAINING PROGRAM

## 2025-01-24 PROCEDURE — 84484 ASSAY OF TROPONIN QUANT: CPT | Performed by: STUDENT IN AN ORGANIZED HEALTH CARE EDUCATION/TRAINING PROGRAM

## 2025-01-24 PROCEDURE — 99285 EMERGENCY DEPT VISIT HI MDM: CPT

## 2025-01-24 PROCEDURE — 25510000001 IOPAMIDOL PER 1 ML: Performed by: STUDENT IN AN ORGANIZED HEALTH CARE EDUCATION/TRAINING PROGRAM

## 2025-01-24 PROCEDURE — 80053 COMPREHEN METABOLIC PANEL: CPT | Performed by: STUDENT IN AN ORGANIZED HEALTH CARE EDUCATION/TRAINING PROGRAM

## 2025-01-24 PROCEDURE — 25010000002 MORPHINE PER 10 MG: Performed by: STUDENT IN AN ORGANIZED HEALTH CARE EDUCATION/TRAINING PROGRAM

## 2025-01-24 PROCEDURE — 71045 X-RAY EXAM CHEST 1 VIEW: CPT

## 2025-01-24 PROCEDURE — 93010 ELECTROCARDIOGRAM REPORT: CPT | Performed by: INTERNAL MEDICINE

## 2025-01-24 PROCEDURE — 85025 COMPLETE CBC W/AUTO DIFF WBC: CPT | Performed by: STUDENT IN AN ORGANIZED HEALTH CARE EDUCATION/TRAINING PROGRAM

## 2025-01-24 PROCEDURE — 96374 THER/PROPH/DIAG INJ IV PUSH: CPT

## 2025-01-24 PROCEDURE — 85610 PROTHROMBIN TIME: CPT | Performed by: STUDENT IN AN ORGANIZED HEALTH CARE EDUCATION/TRAINING PROGRAM

## 2025-01-24 RX ORDER — SODIUM CHLORIDE 0.9 % (FLUSH) 0.9 %
10 SYRINGE (ML) INJECTION AS NEEDED
Status: DISCONTINUED | OUTPATIENT
Start: 2025-01-24 | End: 2025-01-26 | Stop reason: HOSPADM

## 2025-01-24 RX ORDER — IOPAMIDOL 755 MG/ML
100 INJECTION, SOLUTION INTRAVASCULAR
Status: COMPLETED | OUTPATIENT
Start: 2025-01-24 | End: 2025-01-24

## 2025-01-24 RX ORDER — NITROGLYCERIN 0.4 MG/1
0.4 TABLET SUBLINGUAL ONCE
Status: COMPLETED | OUTPATIENT
Start: 2025-01-24 | End: 2025-01-24

## 2025-01-24 RX ADMIN — NITROGLYCERIN 0.4 MG: 0.4 TABLET SUBLINGUAL at 20:10

## 2025-01-24 RX ADMIN — MORPHINE SULFATE 4 MG: 4 INJECTION, SOLUTION INTRAMUSCULAR; INTRAVENOUS at 20:10

## 2025-01-24 RX ADMIN — IOPAMIDOL 100 ML: 755 INJECTION, SOLUTION INTRAVENOUS at 20:49

## 2025-01-25 ENCOUNTER — APPOINTMENT (OUTPATIENT)
Dept: CARDIOLOGY | Facility: HOSPITAL | Age: 70
End: 2025-01-25
Payer: MEDICARE

## 2025-01-25 LAB
ALBUMIN SERPL-MCNC: 3.6 G/DL (ref 3.5–5.2)
ALBUMIN/GLOB SERPL: 1.2 G/DL
ALP SERPL-CCNC: 84 U/L (ref 39–117)
ALT SERPL W P-5'-P-CCNC: 12 U/L (ref 1–33)
AMPHET+METHAMPHET UR QL: POSITIVE
AMPHETAMINES UR QL: POSITIVE
ANION GAP SERPL CALCULATED.3IONS-SCNC: 10 MMOL/L (ref 5–15)
AORTIC ARCH: 2.4 CM
AST SERPL-CCNC: 16 U/L (ref 1–32)
AV MEAN PRESS GRAD SYS DOP V1V2: 4.7 MMHG
AV VENA CONTRACTA: 0.3 CM
AV VMAX SYS DOP: 147.2 CM/SEC
BARBITURATES UR QL SCN: NEGATIVE
BENZODIAZ UR QL SCN: NEGATIVE
BH CV ECHO MEAS - AI P1/2T: 371.4 MSEC
BH CV ECHO MEAS - AO MAX PG: 8.7 MMHG
BH CV ECHO MEAS - AO ROOT DIAM: 4.3 CM
BH CV ECHO MEAS - AO V2 VTI: 26.7 CM
BH CV ECHO MEAS - AVA(I,D): 3.7 CM2
BH CV ECHO MEAS - EDV(CUBED): 59.1 ML
BH CV ECHO MEAS - EDV(MOD-SP2): 122.2 ML
BH CV ECHO MEAS - EDV(MOD-SP4): 116.3 ML
BH CV ECHO MEAS - ESV(CUBED): 15.1 ML
BH CV ECHO MEAS - ESV(MOD-SP2): 60.8 ML
BH CV ECHO MEAS - ESV(MOD-SP4): 53.1 ML
BH CV ECHO MEAS - FS: 36.5 %
BH CV ECHO MEAS - IVS/LVPW: 1.42 CM
BH CV ECHO MEAS - IVSD: 1.14 CM
BH CV ECHO MEAS - LA DIMENSION: 3.1 CM
BH CV ECHO MEAS - LAT PEAK E' VEL: 5.1 CM/SEC
BH CV ECHO MEAS - LV DIASTOLIC VOL/BSA (35-75): 72.8 CM2
BH CV ECHO MEAS - LV MASS(C)D: 116.3 GRAMS
BH CV ECHO MEAS - LV MAX PG: 6.3 MMHG
BH CV ECHO MEAS - LV MEAN PG: 3.8 MMHG
BH CV ECHO MEAS - LV SYSTOLIC VOL/BSA (12-30): 33.2 CM2
BH CV ECHO MEAS - LV V1 MAX: 125.7 CM/SEC
BH CV ECHO MEAS - LV V1 VTI: 28.9 CM
BH CV ECHO MEAS - LVIDD: 3.9 CM
BH CV ECHO MEAS - LVIDS: 2.47 CM
BH CV ECHO MEAS - LVOT AREA: 3.4 CM2
BH CV ECHO MEAS - LVOT DIAM: 2.08 CM
BH CV ECHO MEAS - LVPWD: 0.8 CM
BH CV ECHO MEAS - MED PEAK E' VEL: 5.6 CM/SEC
BH CV ECHO MEAS - MV A MAX VEL: 82.5 CM/SEC
BH CV ECHO MEAS - MV DEC SLOPE: 225.2 CM/SEC2
BH CV ECHO MEAS - MV DEC TIME: 0.22 SEC
BH CV ECHO MEAS - MV E MAX VEL: 48.9 CM/SEC
BH CV ECHO MEAS - MV E/A: 0.59
BH CV ECHO MEAS - MV MAX PG: 4.6 MMHG
BH CV ECHO MEAS - MV MEAN PG: 1.93 MMHG
BH CV ECHO MEAS - MV V2 VTI: 26.3 CM
BH CV ECHO MEAS - MVA(VTI): 3.7 CM2
BH CV ECHO MEAS - PA V2 MAX: 107.5 CM/SEC
BH CV ECHO MEAS - PULM A REVS DUR: 0.12 SEC
BH CV ECHO MEAS - PULM A REVS VEL: 33.1 CM/SEC
BH CV ECHO MEAS - RAP SYSTOLE: 3 MMHG
BH CV ECHO MEAS - RVDD: 3 CM
BH CV ECHO MEAS - SV(LVOT): 98.2 ML
BH CV ECHO MEAS - SV(MOD-SP2): 61.5 ML
BH CV ECHO MEAS - SV(MOD-SP4): 63.3 ML
BH CV ECHO MEAS - SVI(LVOT): 61.5 ML/M2
BH CV ECHO MEAS - SVI(MOD-SP2): 38.5 ML/M2
BH CV ECHO MEAS - SVI(MOD-SP4): 39.6 ML/M2
BH CV ECHO MEAS - TAPSE (>1.6): 2.16 CM
BH CV ECHO MEASUREMENTS AVERAGE E/E' RATIO: 9.14
BH CV XLRA - RV BASE: 2.8 CM
BH CV XLRA - RV LENGTH: 6.5 CM
BH CV XLRA - RV MID: 2.09 CM
BH CV XLRA - TDI S': 18 CM/SEC
BILIRUB SERPL-MCNC: 0.5 MG/DL (ref 0–1.2)
BUN SERPL-MCNC: 15 MG/DL (ref 8–23)
BUN/CREAT SERPL: 24.6 (ref 7–25)
BUPRENORPHINE SERPL-MCNC: NEGATIVE NG/ML
CALCIUM SPEC-SCNC: 8.6 MG/DL (ref 8.6–10.5)
CANNABINOIDS SERPL QL: POSITIVE
CHLORIDE SERPL-SCNC: 100 MMOL/L (ref 98–107)
CHOLEST SERPL-MCNC: 145 MG/DL (ref 0–200)
CO2 SERPL-SCNC: 25 MMOL/L (ref 22–29)
COCAINE UR QL: NEGATIVE
CREAT SERPL-MCNC: 0.61 MG/DL (ref 0.57–1)
DEPRECATED RDW RBC AUTO: 42.7 FL (ref 37–54)
EGFRCR SERPLBLD CKD-EPI 2021: 97.5 ML/MIN/1.73
ERYTHROCYTE [DISTWIDTH] IN BLOOD BY AUTOMATED COUNT: 14 % (ref 12.3–15.4)
FENTANYL UR-MCNC: NEGATIVE NG/ML
GLOBULIN UR ELPH-MCNC: 2.9 GM/DL
GLUCOSE SERPL-MCNC: 104 MG/DL (ref 65–99)
HCT VFR BLD AUTO: 40.2 % (ref 34–46.6)
HDLC SERPL-MCNC: 59 MG/DL (ref 40–60)
HGB BLD-MCNC: 12.8 G/DL (ref 12–15.9)
LDLC SERPL CALC-MCNC: 71 MG/DL (ref 0–100)
LDLC/HDLC SERPL: 1.2 {RATIO}
LEFT ATRIUM VOLUME INDEX: 30 ML/M2
LEFT ATRIUM VOLUME: 43 ML
MCH RBC QN AUTO: 26.8 PG (ref 26.6–33)
MCHC RBC AUTO-ENTMCNC: 31.8 G/DL (ref 31.5–35.7)
MCV RBC AUTO: 84.3 FL (ref 79–97)
METHADONE UR QL SCN: NEGATIVE
OPIATES UR QL: POSITIVE
OXYCODONE UR QL SCN: NEGATIVE
PCP UR QL SCN: NEGATIVE
PLATELET # BLD AUTO: 301 10*3/MM3 (ref 140–450)
PMV BLD AUTO: 9.9 FL (ref 6–12)
POTASSIUM SERPL-SCNC: 3.5 MMOL/L (ref 3.5–5.2)
PROT SERPL-MCNC: 6.5 G/DL (ref 6–8.5)
RBC # BLD AUTO: 4.77 10*6/MM3 (ref 3.77–5.28)
SODIUM SERPL-SCNC: 135 MMOL/L (ref 136–145)
TRICYCLICS UR QL SCN: NEGATIVE
TRIGL SERPL-MCNC: 76 MG/DL (ref 0–150)
TROPONIN T SERPL HS-MCNC: 20 NG/L
VLDLC SERPL-MCNC: 15 MG/DL (ref 5–40)
WBC NRBC COR # BLD AUTO: 8.82 10*3/MM3 (ref 3.4–10.8)

## 2025-01-25 PROCEDURE — 93306 TTE W/DOPPLER COMPLETE: CPT

## 2025-01-25 PROCEDURE — 80061 LIPID PANEL: CPT | Performed by: FAMILY MEDICINE

## 2025-01-25 PROCEDURE — G0378 HOSPITAL OBSERVATION PER HR: HCPCS

## 2025-01-25 PROCEDURE — 96361 HYDRATE IV INFUSION ADD-ON: CPT

## 2025-01-25 PROCEDURE — 99204 OFFICE O/P NEW MOD 45 MIN: CPT | Performed by: NURSE PRACTITIONER

## 2025-01-25 PROCEDURE — 80307 DRUG TEST PRSMV CHEM ANLYZR: CPT

## 2025-01-25 PROCEDURE — 96376 TX/PRO/DX INJ SAME DRUG ADON: CPT

## 2025-01-25 PROCEDURE — 94799 UNLISTED PULMONARY SVC/PX: CPT

## 2025-01-25 PROCEDURE — 96375 TX/PRO/DX INJ NEW DRUG ADDON: CPT

## 2025-01-25 PROCEDURE — 94761 N-INVAS EAR/PLS OXIMETRY MLT: CPT

## 2025-01-25 PROCEDURE — 94664 DEMO&/EVAL PT USE INHALER: CPT

## 2025-01-25 PROCEDURE — 25010000002 HYDROMORPHONE 1 MG/ML SOLUTION

## 2025-01-25 PROCEDURE — 25010000002 HYDROMORPHONE PER 4 MG

## 2025-01-25 PROCEDURE — 80053 COMPREHEN METABOLIC PANEL: CPT

## 2025-01-25 PROCEDURE — 93005 ELECTROCARDIOGRAM TRACING: CPT | Performed by: FAMILY MEDICINE

## 2025-01-25 PROCEDURE — 25010000002 MORPHINE PER 10 MG

## 2025-01-25 PROCEDURE — 85027 COMPLETE CBC AUTOMATED: CPT

## 2025-01-25 PROCEDURE — 25810000003 SODIUM CHLORIDE 0.9 % SOLUTION

## 2025-01-25 PROCEDURE — 25010000002 LABETALOL 5 MG/ML SOLUTION: Performed by: STUDENT IN AN ORGANIZED HEALTH CARE EDUCATION/TRAINING PROGRAM

## 2025-01-25 PROCEDURE — 93306 TTE W/DOPPLER COMPLETE: CPT | Performed by: INTERNAL MEDICINE

## 2025-01-25 PROCEDURE — 25510000001 PERFLUTREN 6.52 MG/ML SUSPENSION: Performed by: FAMILY MEDICINE

## 2025-01-25 PROCEDURE — 84484 ASSAY OF TROPONIN QUANT: CPT

## 2025-01-25 PROCEDURE — 93010 ELECTROCARDIOGRAM REPORT: CPT | Performed by: STUDENT IN AN ORGANIZED HEALTH CARE EDUCATION/TRAINING PROGRAM

## 2025-01-25 RX ORDER — LABETALOL HYDROCHLORIDE 5 MG/ML
10 INJECTION, SOLUTION INTRAVENOUS ONCE AS NEEDED
Status: DISCONTINUED | OUTPATIENT
Start: 2025-01-25 | End: 2025-01-25

## 2025-01-25 RX ORDER — SODIUM CHLORIDE 0.9 % (FLUSH) 0.9 %
10 SYRINGE (ML) INJECTION AS NEEDED
Status: DISCONTINUED | OUTPATIENT
Start: 2025-01-25 | End: 2025-01-26 | Stop reason: HOSPADM

## 2025-01-25 RX ORDER — LIDOCAINE 4 G/G
1 PATCH TOPICAL
Status: DISCONTINUED | OUTPATIENT
Start: 2025-01-25 | End: 2025-01-26 | Stop reason: HOSPADM

## 2025-01-25 RX ORDER — MORPHINE SULFATE 2 MG/ML
1 INJECTION, SOLUTION INTRAMUSCULAR; INTRAVENOUS EVERY 4 HOURS PRN
Status: DISCONTINUED | OUTPATIENT
Start: 2025-01-25 | End: 2025-01-26

## 2025-01-25 RX ORDER — ALPRAZOLAM 0.25 MG/1
0.25 TABLET ORAL 3 TIMES DAILY PRN
Status: DISCONTINUED | OUTPATIENT
Start: 2025-01-25 | End: 2025-01-26 | Stop reason: HOSPADM

## 2025-01-25 RX ORDER — ACETAMINOPHEN 160 MG/5ML
650 SOLUTION ORAL EVERY 4 HOURS PRN
Status: DISCONTINUED | OUTPATIENT
Start: 2025-01-25 | End: 2025-01-26 | Stop reason: HOSPADM

## 2025-01-25 RX ORDER — NALOXONE HCL 0.4 MG/ML
0.4 VIAL (ML) INJECTION
Status: DISCONTINUED | OUTPATIENT
Start: 2025-01-25 | End: 2025-01-26

## 2025-01-25 RX ORDER — IPRATROPIUM BROMIDE AND ALBUTEROL SULFATE 2.5; .5 MG/3ML; MG/3ML
3 SOLUTION RESPIRATORY (INHALATION)
Status: DISCONTINUED | OUTPATIENT
Start: 2025-01-25 | End: 2025-01-26 | Stop reason: HOSPADM

## 2025-01-25 RX ORDER — NALOXONE HCL 0.4 MG/ML
0.4 VIAL (ML) INJECTION
Status: DISCONTINUED | OUTPATIENT
Start: 2025-01-25 | End: 2025-01-25

## 2025-01-25 RX ORDER — ONDANSETRON 4 MG/1
4 TABLET, ORALLY DISINTEGRATING ORAL EVERY 6 HOURS PRN
Status: DISCONTINUED | OUTPATIENT
Start: 2025-01-25 | End: 2025-01-26 | Stop reason: HOSPADM

## 2025-01-25 RX ORDER — ACETAMINOPHEN 650 MG/1
650 SUPPOSITORY RECTAL EVERY 4 HOURS PRN
Status: DISCONTINUED | OUTPATIENT
Start: 2025-01-25 | End: 2025-01-26 | Stop reason: HOSPADM

## 2025-01-25 RX ORDER — SODIUM CHLORIDE 9 MG/ML
40 INJECTION, SOLUTION INTRAVENOUS AS NEEDED
Status: DISCONTINUED | OUTPATIENT
Start: 2025-01-25 | End: 2025-01-26 | Stop reason: HOSPADM

## 2025-01-25 RX ORDER — LOSARTAN POTASSIUM 50 MG/1
50 TABLET ORAL
Status: DISCONTINUED | OUTPATIENT
Start: 2025-01-25 | End: 2025-01-26 | Stop reason: HOSPADM

## 2025-01-25 RX ORDER — LABETALOL HYDROCHLORIDE 5 MG/ML
10 INJECTION, SOLUTION INTRAVENOUS ONCE AS NEEDED
Status: DISCONTINUED | OUTPATIENT
Start: 2025-01-25 | End: 2025-01-26 | Stop reason: HOSPADM

## 2025-01-25 RX ORDER — METOPROLOL TARTRATE 25 MG/1
25 TABLET, FILM COATED ORAL EVERY 12 HOURS SCHEDULED
Status: DISCONTINUED | OUTPATIENT
Start: 2025-01-25 | End: 2025-01-26 | Stop reason: HOSPADM

## 2025-01-25 RX ORDER — NITROGLYCERIN 0.4 MG/1
0.4 TABLET SUBLINGUAL
Status: DISCONTINUED | OUTPATIENT
Start: 2025-01-25 | End: 2025-01-26 | Stop reason: HOSPADM

## 2025-01-25 RX ORDER — ONDANSETRON 2 MG/ML
4 INJECTION INTRAMUSCULAR; INTRAVENOUS EVERY 6 HOURS PRN
Status: DISCONTINUED | OUTPATIENT
Start: 2025-01-25 | End: 2025-01-26 | Stop reason: HOSPADM

## 2025-01-25 RX ORDER — HYDROMORPHONE HYDROCHLORIDE 1 MG/ML
0.5 INJECTION, SOLUTION INTRAMUSCULAR; INTRAVENOUS; SUBCUTANEOUS
Status: DISCONTINUED | OUTPATIENT
Start: 2025-01-25 | End: 2025-01-25

## 2025-01-25 RX ORDER — KETOROLAC TROMETHAMINE 15 MG/ML
15 INJECTION, SOLUTION INTRAMUSCULAR; INTRAVENOUS EVERY 6 HOURS PRN
Status: DISCONTINUED | OUTPATIENT
Start: 2025-01-25 | End: 2025-01-26 | Stop reason: HOSPADM

## 2025-01-25 RX ORDER — SODIUM CHLORIDE 0.9 % (FLUSH) 0.9 %
10 SYRINGE (ML) INJECTION EVERY 12 HOURS SCHEDULED
Status: DISCONTINUED | OUTPATIENT
Start: 2025-01-25 | End: 2025-01-26 | Stop reason: HOSPADM

## 2025-01-25 RX ORDER — LABETALOL HYDROCHLORIDE 5 MG/ML
10 INJECTION, SOLUTION INTRAVENOUS ONCE
Status: COMPLETED | OUTPATIENT
Start: 2025-01-25 | End: 2025-01-25

## 2025-01-25 RX ORDER — ACETAMINOPHEN 325 MG/1
650 TABLET ORAL EVERY 4 HOURS PRN
Status: DISCONTINUED | OUTPATIENT
Start: 2025-01-25 | End: 2025-01-26 | Stop reason: HOSPADM

## 2025-01-25 RX ORDER — SODIUM CHLORIDE 9 MG/ML
75 INJECTION, SOLUTION INTRAVENOUS CONTINUOUS
Status: DISCONTINUED | OUTPATIENT
Start: 2025-01-25 | End: 2025-01-25

## 2025-01-25 RX ADMIN — IPRATROPIUM BROMIDE AND ALBUTEROL SULFATE 3 ML: .5; 3 SOLUTION RESPIRATORY (INHALATION) at 08:05

## 2025-01-25 RX ADMIN — Medication 10 ML: at 20:55

## 2025-01-25 RX ADMIN — IPRATROPIUM BROMIDE AND ALBUTEROL SULFATE 3 ML: .5; 3 SOLUTION RESPIRATORY (INHALATION) at 11:49

## 2025-01-25 RX ADMIN — IPRATROPIUM BROMIDE AND ALBUTEROL SULFATE 3 ML: .5; 3 SOLUTION RESPIRATORY (INHALATION) at 19:02

## 2025-01-25 RX ADMIN — METOPROLOL TARTRATE 25 MG: 25 TABLET, FILM COATED ORAL at 20:54

## 2025-01-25 RX ADMIN — NITROGLYCERIN 0.4 MG: 0.4 TABLET SUBLINGUAL at 08:39

## 2025-01-25 RX ADMIN — HYDROMORPHONE HYDROCHLORIDE 0.5 MG: 1 INJECTION, SOLUTION INTRAMUSCULAR; INTRAVENOUS; SUBCUTANEOUS at 12:09

## 2025-01-25 RX ADMIN — PERFLUTREN 13.04 MG: 6.52 INJECTION, SUSPENSION INTRAVENOUS at 10:19

## 2025-01-25 RX ADMIN — HYDROMORPHONE HYDROCHLORIDE 1 MG: 1 INJECTION, SOLUTION INTRAMUSCULAR; INTRAVENOUS; SUBCUTANEOUS at 01:27

## 2025-01-25 RX ADMIN — SODIUM CHLORIDE 75 ML/HR: 9 INJECTION, SOLUTION INTRAVENOUS at 02:56

## 2025-01-25 RX ADMIN — LIDOCAINE 1 PATCH: 0.04 PATCH TOPICAL at 08:30

## 2025-01-25 RX ADMIN — LABETALOL HYDROCHLORIDE 10 MG: 5 INJECTION INTRAVENOUS at 01:02

## 2025-01-25 RX ADMIN — LOSARTAN POTASSIUM 50 MG: 50 TABLET, FILM COATED ORAL at 08:30

## 2025-01-25 RX ADMIN — IPRATROPIUM BROMIDE AND ALBUTEROL SULFATE 3 ML: .5; 3 SOLUTION RESPIRATORY (INHALATION) at 15:47

## 2025-01-25 RX ADMIN — Medication 10 ML: at 08:30

## 2025-01-25 NOTE — PROGRESS NOTES
[facility] Medicine Services  INPATIENT PROGRESS NOTE    Patient Name: Prisca Wong  Date of Admission: 1/24/2025  Today's Date: 01/25/25  Length of Stay: 0  Primary Care Physician: Provider, No Known    Subjective   Chief Complaint: Chest pain  HPI   69-year-old female, on no medical treatment for hypertension, history of COPD, with no medical follow-up, came to the hospital reporting chest pain that was present on the anterior chest, not radiated.  The patient was admitted for medical management.  Today, I found patient complaining of chest pain, anterior chest precordial, increasing with inspiration but she also reports has been present for the past 2 to 3 days, and is constant, day and night.  She reports no significant dyspnea.  No fevers.  Patient stated she smokes.        Review of Systems   All pertinent negatives and positives are as above. All other systems have been reviewed and are negative unless otherwise stated.     Objective    Temp:  [98.2 °F (36.8 °C)-99.1 °F (37.3 °C)] 98.2 °F (36.8 °C)  Heart Rate:  [] 83  Resp:  [16-24] 20  BP: (132-208)/() 138/86  Physical Exam  Constitutional:       Appearance: Anxious, alert, oriented x 3.  No respiratory distress  HENT:      Head: Normocephalic and atraumatic.      Nose: Nose normal.      Mouth/Throat:      Mouth: Mucous membranes are moist.   Eyes:      Extraocular Movements: Extraocular movements intact.      Conjunctiva/sclera: Conjunctivae normal.      Pupils: Pupils are equal, round, and reactive to light.   Chest.  She does not report pain on palpation of her chest during my visit  Cardiovascular:      Rate and Rhythm: Normal rate and regular rhythm.      Pulses: Normal pulses.   Pulmonary:      Effort: No respiratory distress.      Breath sounds: Symmetric breath sounds.  Prolonged expiratory phase.  Abdominal:      General: Abdomen is flat. Bowel sounds are normal.      Palpations: Abdomen is soft.       "Tenderness: There is no guarding or rebound.   Extremities:  No lower extremity edema.  Skin:     Capillary Refill: Capillary refill takes less than 2 seconds.      Coloration: Skin is not jaundiced.      Findings: No rash.   Neurological:      General: No focal deficit present.      Mental Status: Patient is alert, oriented to place time and person.        Results Review:  I have reviewed the labs, radiology results, and diagnostic studies.    Laboratory Data:   Results from last 7 days   Lab Units 01/25/25  0232 01/1955   WBC 10*3/mm3 8.82 9.84   HEMOGLOBIN g/dL 12.8 12.7   HEMATOCRIT % 40.2 38.3   PLATELETS 10*3/mm3 301 292        Results from last 7 days   Lab Units 01/25/25 0232 01/1955   SODIUM mmol/L 135* 134*   POTASSIUM mmol/L 3.5 3.5   CHLORIDE mmol/L 100 100   CO2 mmol/L 25.0 23.0   BUN mg/dL 15 16   CREATININE mg/dL 0.61 0.61   CALCIUM mg/dL 8.6 8.5*   BILIRUBIN mg/dL 0.5 0.4   ALK PHOS U/L 84 88   ALT (SGPT) U/L 12 12   AST (SGOT) U/L 16 16   GLUCOSE mg/dL 104* 95       Culture Data:   No results found for: \"BLOODCX\", \"URINECX\", \"WOUNDCX\", \"MRSACX\", \"RESPCX\", \"STOOLCX\"    Radiology Data:   Imaging Results (Last 24 Hours)       Procedure Component Value Units Date/Time    CT Angiogram Chest [[id number]] Collected: 01/24/25 2058     Updated: 01/24/25 2105    Narrative:      EXAMINATION: CT ANGIOGRAM CHEST-      1/24/2025 7:35 PM     HISTORY: chest pain     In order to have a CT radiation dose as low as reasonably achievable  Automated Exposure Control was utilized for adjustment of the mA and/or  KV according to patient size.     CT Dose DLP = 118.97 mGy.cm.  (If there are multiple studies performed at the same time this  represents the total dose).     CT angiogram chest with IV contrast injection.  CT angiography protocol.  CT imaging with bolus IV contrast injection.  Under concurrent supervision axial, sagittal, coronal,  three-dimensional, and MIP data sets were constructed on an " independent  work station.     Comparison:  11/7/2021 chest CT.     Heart size is within normal limits.  Dilated ascending thoracic aorta measuring 44 x 47 mm.  No dissection.     Symmetric and normally opacified pulmonary arteries.  No pulmonary embolism.     No mediastinal mass.     Hyperexpanded lungs.  Diffuse emphysema.  No pneumonia, pneumothorax, or pleural effusion.     Moderate degenerative disc and endplate change throughout the thoracic  spine.       Impression:      1. Dilated ascending thoracic aorta. Follow-up cardiology consult  recommended regarding hypertension and aortic valve assessment.  2. No pulmonary embolism.  3. Prominent diffuse chronic lung changes.     This report was signed and finalized on 1/24/2025 9:01 PM by Dr. Thony Bearden MD.       XR Chest 1 View [[id number]] Collected: 01/24/25 2032     Updated: 01/24/25 2036    Narrative:      EXAMINATION: XR CHEST 1 VW-     1/24/2025 7:24 PM     HISTORY: Shortness of breath.     1 view chest x-ray.     COMPARISON:  11/7/2021.     Heart size is within normal limits.     Chronic lung change with mild hyperexpansion.     LEFT diaphragm elevation.     No pneumonia, pneumothorax, or congestive failure.       Impression:      1. Chronic lung changes.  2. No focal acute infiltrate.           This report was signed and finalized on 1/24/2025 8:33 PM by Dr. Thony Bearden MD.               I have reviewed the patient's current medications.     Assessment/Plan   Assessment  Active Hospital Problems    Diagnosis     **Chest pain        Atypical chest pain  Dilated ascending thoracic aorta  Hypertensive urgency  COPD/emphysema  Chronic smoker  Anxiety disorder  Moderate malnutrition        Troponins were 16, 18, and 20  proBNP 1400.  Sodium 135, potassium 3.5.  BUN 15 and creatinine 0.61.  Glucose 104.  White blood cell count normal.    Cardiac stress test 2021 negative    CTA chest  Heart size is within normal limits.  Dilated ascending thoracic aorta  measuring 44 x 47 mm.  No dissection.  Symmetric and normally opacified pulmonary arteries.  No pulmonary embolism.  No mediastinal mass.  Hyperexpanded lungs.  Diffuse emphysema.  No pneumonia, pneumothorax, or pleural effusion.  Moderate degenerative disc and endplate change throughout the thoracic  spine.  1. Dilated ascending thoracic aorta.   2. No pulmonary embolism.  3. Prominent diffuse chronic lung changes.       Treatment Plan  Cardiology consultation placed this morning  Echocardiogram is pending  Nitroglycerin sublingual as needed.  Atrovent nebulizations as needed    Blood pressure has improved.  Continue losartan 50 mg p.o. daily.  Monitor response.    Thoracic surgery was consulted on admission.    Recommend outpatient close follow-up to monitor blood pressure treatment on discharge.  Smoking cessation recommended.  Pulmonary function tests and pulmonary follow-up recommended as well.    SCDs for DVT prophylaxis      Medical Decision Making  Number and Complexity of problems: 7, moderate to high complexity  Differential Diagnosis: See above    Conditions and Status        Condition is unchanged.     MDM Data  External documents reviewed: None  Cardiac tracing (EKG, telemetry) interpretation: Sinus rhythm  Radiology interpretation: Radiology reports reviewed  Labs reviewed: Yes  Any tests that were considered but not ordered: No     Decision rules/scores evaluated (example [id number], Wells, etc): None     Discussed with: Patient and nurse     Care Planning  Shared decision making: With patient  Code status and discussions: Full code    Disposition  Social Determinants of Health that impact treatment or disposition: No medical follow-up smoker  I expect the patient to be discharged to home in 1-2 days.         Electronically signed by Binu Monsalve MD, 01/25/25, 09:12 CST.

## 2025-01-25 NOTE — H&P
[facility] Medicine Services  HISTORY AND PHYSICAL    Date of Admission: 1/24/2025  Primary Care Physician: Provider, Lorri Known    Subjective   Primary Historian: Patient    Chief Complaint: Chest pain    History of Present Illness  History taking is limited as patient is a poor historian.  This is a 69-year-old female patient with a medical history of hypertension, COPD, not on any home medications as reported, presenting to the ED for a left-sided chest pain.  As reported the patient was having a chest pain that was associated with coughing.  She was found to have elevated blood pressure on presentation.  Patient denies any shortness of breath, nausea vomiting or diarrhea, or fever.        Review of Systems   Otherwise complete ROS reviewed and negative except as mentioned in the HPI.    Past Medical History:   Past Medical History:   Diagnosis Date    ADHD (attention deficit hyperactivity disorder)     COPD (chronic obstructive pulmonary disease)     Hypertension      Past Surgical History:  Past Surgical History:   Procedure Laterality Date    APPENDECTOMY      CHOLECYSTECTOMY      MANDIBLE SURGERY      TONSILLECTOMY       Social History:  reports that she has been smoking cigarettes. She has never used smokeless tobacco. She reports that she does not currently use alcohol. Drug use questions deferred to the physician.    Family History: family history includes No Known Problems in her father and mother.       Allergies:  No Known Allergies    Medications:  Prior to Admission medications    Medication Sig Start Date End Date Taking? Authorizing Provider   lisinopril (PRINIVIL,ZESTRIL) 10 MG tablet Take 10 mg by mouth Daily.    Provider, MD Manjula   predniSONE 5 MG (21) tablet therapy pack dose pack Take as directed on package instructions. 11/8/21   Burke Motta, DO     I have utilized all available immediate resources to obtain, update, or review the patient's  "current medications (including all prescriptions, over-the-counter products, herbals, cannabis/cannabidiol products, and vitamin/mineral/dietary (nutritional) supplements).    Objective     Vital Signs: /91 (BP Location: Left arm, Patient Position: Lying)   Pulse 75   Temp 98.8 °F (37.1 °C) (Oral)   Resp 20   Ht 172.7 cm (68\")   Wt 51 kg (112 lb 8 oz)   SpO2 95%   BMI 17.11 kg/m²   Physical Exam  Constitutional:       Appearance: She is ill-appearing.   Cardiovascular:      Rate and Rhythm: Normal rate and regular rhythm.      Pulses: Normal pulses.      Heart sounds: Normal heart sounds. No murmur heard.  Pulmonary:      Effort: Pulmonary effort is normal. No respiratory distress.      Breath sounds: Normal breath sounds. No wheezing or rales.   Abdominal:      General: Bowel sounds are normal. There is no distension.      Palpations: Abdomen is soft.      Tenderness: There is no abdominal tenderness. There is no guarding.   Musculoskeletal:      Right lower leg: No edema.      Left lower leg: No edema.   Skin:     General: Skin is warm.   Neurological:      General: No focal deficit present.      Mental Status: She is alert and oriented to person, place, and time.              Results Reviewed:  Lab Results (last 24 hours)       Procedure Component Value Units Date/Time    CBC (No Diff) [[id number]]  (Normal) Collected: 01/25/25 0232    Specimen: Blood Updated: 01/25/25 0310     WBC 8.82 10*3/mm3      RBC 4.77 10*6/mm3      Hemoglobin 12.8 g/dL      Hematocrit 40.2 %      MCV 84.3 fL      MCH 26.8 pg      MCHC 31.8 g/dL      RDW 14.0 %      RDW-SD 42.7 fl      MPV 9.9 fL      Platelets 301 10*3/mm3     Comprehensive Metabolic Panel [[id number]] Collected: 01/25/25 0232    Specimen: Blood Updated: 01/25/25 0259    High Sensitivity Troponin T 1Hr [[id number]]  (Abnormal) Collected: 01/24/25 2313    Specimen: Blood from Arm, Right Updated: 01/24/25 3645     HS Troponin T 18 ng/L      Troponin T Numeric " Delta 2 ng/L      Troponin T % Delta 13    Narrative:      High Sensitive Troponin T Reference Range:  <14.0 ng/L- Negative Female for AMI  <22.0 ng/L- Negative Male for AMI  >=14 - Abnormal Female indicating possible myocardial injury.  >=22 - Abnormal Male indicating possible myocardial injury.   Clinicians would have to utilize clinical acumen, EKG, Troponin, and serial changes to determine if it is an Acute Myocardial Infarction or myocardial injury due to an underlying chronic condition.         Comprehensive Metabolic Panel [[id number]]  (Abnormal) Collected: 01/1955    Specimen: Blood from Arm, Right Updated: 01/24/25 2027     Glucose 95 mg/dL      BUN 16 mg/dL      Creatinine 0.61 mg/dL      Sodium 134 mmol/L      Potassium 3.5 mmol/L      Chloride 100 mmol/L      CO2 23.0 mmol/L      Calcium 8.5 mg/dL      Total Protein 6.5 g/dL      Albumin 3.6 g/dL      ALT (SGPT) 12 U/L      AST (SGOT) 16 U/L      Alkaline Phosphatase 88 U/L      Total Bilirubin 0.4 mg/dL      Globulin 2.9 gm/dL      A/G Ratio 1.2 g/dL      BUN/Creatinine Ratio 26.2     Anion Gap 11.0 mmol/L      eGFR 97.5 mL/min/1.73     Narrative:      GFR Categories in Chronic Kidney Disease (CKD)      GFR Category          GFR (mL/min/1.73)    Interpretation  G1                     90 or greater         Normal or high (1)  G2                      60-89                Mild decrease (1)  G3a                   45-59                Mild to moderate decrease  G3b                   30-44                Moderate to severe decrease  G4                    15-29                Severe decrease  G5                    14 or less           Kidney failure          (1)In the absence of evidence of kidney disease, neither GFR category G1 or G2 fulfill the criteria for CKD.    eGFR calculation 2021 CKD-EPI creatinine equation, which does not include race as a factor    Protime-INR [[id number]]  (Normal) Collected: 01/1955    Specimen: Blood Updated: 01/24/25  2026     Protime 13.9 Seconds      INR 1.02    aPTT [[id number]]  (Normal) Collected: 01/1955    Specimen: Blood Updated: 01/24/25 2026     PTT 34.3 seconds     BNP [[id number]]  (Abnormal) Collected: 01/1955    Specimen: Blood from Arm, Right Updated: 01/24/25 2024     proBNP 1,425.0 pg/mL     Narrative:      This assay is used as an aid in the diagnosis of individuals suspected of having heart failure. It can be used as an aid in the diagnosis of acute decompensated heart failure (ADHF) in patients presenting with signs and symptoms of ADHF to the emergency department (ED). In addition, NT-proBNP of <300 pg/mL indicates ADHF is not likely.    Age Range Result Interpretation  NT-proBNP Concentration (pg/mL:      <50             Positive            >450                   Gray                 300-450                    Negative             <300    50-75           Positive            >900                  Gray                300-900                  Negative            <300      >75             Positive            >1800                  Gray                300-1800                  Negative            <300    High Sensitivity Troponin T [[id number]]  (Abnormal) Collected: 01/1955    Specimen: Blood from Arm, Right Updated: 01/24/25 2024     HS Troponin T 16 ng/L     Narrative:      High Sensitive Troponin T Reference Range:  <14.0 ng/L- Negative Female for AMI  <22.0 ng/L- Negative Male for AMI  >=14 - Abnormal Female indicating possible myocardial injury.  >=22 - Abnormal Male indicating possible myocardial injury.   Clinicians would have to utilize clinical acumen, EKG, Troponin, and serial changes to determine if it is an Acute Myocardial Infarction or myocardial injury due to an underlying chronic condition.         [place] Draw [[id number]] Collected: 01/1955    Specimen: Blood Updated: 01/24/25 2016    Narrative:      The following orders were created for panel order [place] Draw.  Procedure                                Abnormality         Status                     ---------                               -----------         ------                     Green Top (Gel)[[id number]]                                  Final result               Lavender Top[[id number]]                                     Final result               Red Top[[id number]]                                          Final result               Light Blue Top[[id number]]                                   Final result                 Please view results for these tests on the individual orders.    Green Top (Gel) [[id number]] Collected: 01/1955    Specimen: Blood from Arm, Right Updated: 01/24/25 2016     Extra Tube Hold for add-ons.     Comment: Auto resulted.       Lavender Top [[id number]] Collected: 01/1955    Specimen: Blood Updated: 01/24/25 2016     Extra Tube hold for add-on     Comment: Auto resulted       Red Top [[id number]] Collected: 01/1955    Specimen: Blood Updated: 01/24/25 2016     Extra Tube Hold for add-ons.     Comment: Auto resulted.       Light Blue Top [[id number]] Collected: 01/1955    Specimen: Blood Updated: 01/24/25 2016     Extra Tube Hold for add-ons.     Comment: Auto resulted       CBC & Differential [[id number]]  (Abnormal) Collected: 01/1955    Specimen: Blood Updated: 01/24/25 2007    Narrative:      The following orders were created for panel order CBC & Differential.  Procedure                               Abnormality         Status                     ---------                               -----------         ------                     CBC Auto Differential[[id number]]        Abnormal            Final result                 Please view results for these tests on the individual orders.    CBC Auto Differential [[id number]]  (Abnormal) Collected: 01/1955    Specimen: Blood Updated: 01/24/25 2007     WBC 9.84 10*3/mm3      RBC 4.63 10*6/mm3      Hemoglobin 12.7 g/dL       Hematocrit 38.3 %      MCV 82.7 fL      MCH 27.4 pg      MCHC 33.2 g/dL      RDW 14.0 %      RDW-SD 41.5 fl      MPV 9.6 fL      Platelets 292 10*3/mm3      Neutrophil % 70.3 %      Lymphocyte % 16.6 %      Monocyte % 11.5 %      Eosinophil % 0.6 %      Basophil % 0.5 %      Immature Grans % 0.5 %      Neutrophils, Absolute 6.92 10*3/mm3      Lymphocytes, Absolute 1.63 10*3/mm3      Monocytes, Absolute 1.13 10*3/mm3      Eosinophils, Absolute 0.06 10*3/mm3      Basophils, Absolute 0.05 10*3/mm3      Immature Grans, Absolute 0.05 10*3/mm3      nRBC 0.0 /100 WBC           Imaging Results (Last 24 Hours)       Procedure Component Value Units Date/Time    CT Angiogram Chest [[id number]] Collected: 01/24/25 2058     Updated: 01/24/25 2105    Narrative:      EXAMINATION: CT ANGIOGRAM CHEST-      1/24/2025 7:35 PM     HISTORY: chest pain     In order to have a CT radiation dose as low as reasonably achievable  Automated Exposure Control was utilized for adjustment of the mA and/or  KV according to patient size.     CT Dose DLP = 118.97 mGy.cm.  (If there are multiple studies performed at the same time this  represents the total dose).     CT angiogram chest with IV contrast injection.  CT angiography protocol.  CT imaging with bolus IV contrast injection.  Under concurrent supervision axial, sagittal, coronal,  three-dimensional, and MIP data sets were constructed on an independent  work station.     Comparison:  11/7/2021 chest CT.     Heart size is within normal limits.  Dilated ascending thoracic aorta measuring 44 x 47 mm.  No dissection.     Symmetric and normally opacified pulmonary arteries.  No pulmonary embolism.     No mediastinal mass.     Hyperexpanded lungs.  Diffuse emphysema.  No pneumonia, pneumothorax, or pleural effusion.     Moderate degenerative disc and endplate change throughout the thoracic  spine.       Impression:      1. Dilated ascending thoracic aorta. Follow-up cardiology consult  recommended  regarding hypertension and aortic valve assessment.  2. No pulmonary embolism.  3. Prominent diffuse chronic lung changes.     This report was signed and finalized on 1/24/2025 9:01 PM by Dr. Thony Bearden MD.       XR Chest 1 View [[id number]] Collected: 01/24/25 2032     Updated: 01/24/25 2036    Narrative:      EXAMINATION: XR CHEST 1 VW-     1/24/2025 7:24 PM     HISTORY: Shortness of breath.     1 view chest x-ray.     COMPARISON:  11/7/2021.     Heart size is within normal limits.     Chronic lung change with mild hyperexpansion.     LEFT diaphragm elevation.     No pneumonia, pneumothorax, or congestive failure.       Impression:      1. Chronic lung changes.  2. No focal acute infiltrate.           This report was signed and finalized on 1/24/2025 8:33 PM by Dr. Thony Bearden MD.             I have personally reviewed and interpreted the radiology studies and ECG obtained at time of admission.     Assessment / Plan   Assessment:   Active Hospital Problems    Diagnosis     **Chest pain        Treatment Plan  The patient will be admitted to my service [facility] at [facility].     Assessment and plan:  #Chest pain.  #Hypertensive crisis  #Dilation of the thoracic ascending aorta.    -ED contacted CT surgeon Dr. Gallo who recommended no immediate intervention, and patient can be admitted to telemetry with hospitalist service and they can see her in the morning.  -Admitting to telemetry.  -Controlling blood pressure.  -Troponin slightly elevated, will recheck again.  -Placing an order for TTE in the morning.  -Controlling pain with as needed orders.  -Keeping her n.p.o. in case of any procedure during the day.  -DVT prophylaxis with SCDs for now.    Medical Decision Making  Number and Complexity of problems: 3 and moderate  Differential Diagnosis: As above    Conditions and Status        Condition is worsening.     [facility] Data  External documents reviewed: Yes  Cardiac tracing (EKG, telemetry)  interpretation: EKG interpretation reviewed  Radiology interpretation: Chest x-ray and CT angio chest reviewed  Labs reviewed: Yes  Any tests that were considered but not ordered: None     Decision rules/scores evaluated (example [id number], Wells, etc): None     Discussed with: ED provider, nurse and patient     Care Planning  Shared decision making: Yes, I explained the plan to the patient and she was agreeable  Code status and discussions: I discussed CODE STATUS with the patient and she wants to be full code    Disposition  Social Determinants of Health that impact treatment or disposition: Patient lives alone as reported.  Estimated length of stay is 2 to 3 days    I confirmed that the patient's advanced care plan is present, code status is documented, and a surrogate decision maker is listed in the patient's medical record.      The patient was seen and examined by me on 1/25 at 12:30 AM.    Electronically signed by Lizette Lora MD, 01/25/25, 03:17 CST.

## 2025-01-25 NOTE — CASE MANAGEMENT/SOCIAL WORK
Continued Stay Note   [place]     Patient Name: Prisca Wong  MRN: [id number]  Today's Date: 1/25/2025    Admit Date: 1/24/2025        Discharge Plan       Row Name 01/25/25 1331       Plan    Plan Comments Pt was asleep both times sw stoped by. Sw will try again tomorrow.                   Discharge Codes    No documentation.                       Spencer Marvin

## 2025-01-25 NOTE — CASE MANAGEMENT/SOCIAL WORK
Discharge Planning Assessment  [facility]     Patient Name: Prisca Wong  MRN: [id number]  Today's Date: 1/25/2025    Admit Date: 1/24/2025        Discharge Needs Assessment       Row Name 01/25/25 1519       Living Environment    People in Home alone    Current Living Arrangements home    Potentially Unsafe Housing Conditions none    Primary Care Provided by self    Provides Primary Care For no one    Family Caregiver if Needed none    Quality of Family Relationships unable to assess    Able to Return to Prior Arrangements yes       Resource/Environmental Concerns    Resource/Environmental Concerns none    Financial Concerns none    Transportation Concerns none       Transition Planning    Patient/Family Anticipates Transition to home    Patient/Family Anticipated Services at Transition none    Transportation Anticipated family or friend will provide;car, drives self       Discharge Needs Assessment    Equipment Currently Used at Home none    Concerns to be Addressed no discharge needs identified    Anticipated Changes Related to Illness none    Equipment Needed After Discharge none    Discharge Coordination/Progress Pt stated she lives along and provides her own care. Pt does not use medical equipment is not requesting any at this time. Pt stated no needs at this time, sw will follow and assist as needed.                   Discharge Plan       Row Name 01/25/25 5694       Plan    Plan Comments Pt was asleep both times sw stoped by. Sw will try again tomorrow.                  Continued Care and Services - Admitted Since 1/24/2025    No active coordination exists for this encounter.          Demographic Summary    No documentation.                  Functional Status    No documentation.                  Psychosocial    No documentation.                  Abuse/Neglect    No documentation.                  Legal    No documentation.                  Substance Abuse    No documentation.                  Patient Forms     No documentation.                     Spencer Marvin

## 2025-01-25 NOTE — ED PROVIDER NOTES
Subjective   History of Present Illness  This is a 69-year-old female with history of COPD, hypertension, poor historian.  In the emergency room for left-sided chest pain, pleuritic, positional in nature.  Pain is associated with coughing.  Denies shortness of breath, fever, nausea, vomiting, diaphoresis.  No exertional component of chest pain.        Review of Systems   All other systems reviewed and are negative.      Past Medical History:   Diagnosis Date    ADHD (attention deficit hyperactivity disorder)     COPD (chronic obstructive pulmonary disease)     Hypertension        No Known Allergies    Past Surgical History:   Procedure Laterality Date    APPENDECTOMY      CHOLECYSTECTOMY      MANDIBLE SURGERY      TONSILLECTOMY         Family History   Problem Relation Age of Onset    No Known Problems Mother     No Known Problems Father        Social History     Socioeconomic History    Marital status: Single   Tobacco Use    Smoking status: Every Day     Current packs/day: 0.50     Types: Cigarettes    Smokeless tobacco: Never   Vaping Use    Vaping status: Never Used   Substance and Sexual Activity    Alcohol use: Not Currently    Drug use: Defer    Sexual activity: Defer           Objective   Physical Exam  Constitutional:       General: She is not in acute distress.     Comments: Patient appears chronically ill   Eyes:      Pupils: Pupils are equal, round, and reactive to light.   Neck:      Vascular: No JVD.   Cardiovascular:      Rate and Rhythm: Normal rate and regular rhythm.      Heart sounds: Normal heart sounds. No murmur heard.  Pulmonary:      Effort: Pulmonary effort is normal. No tachypnea or respiratory distress.      Breath sounds: No stridor.      Comments: Negative for abnormal breath sounds.  Abdominal:      Palpations: Abdomen is soft.      Tenderness: There is no abdominal tenderness. There is no guarding or rebound.   Musculoskeletal:         General: Normal range of motion.      Cervical  back: Normal range of motion and neck supple.   Skin:     Comments: Patient has distal cyanosis of both upper extremities this is chronic   Neurological:      General: No focal deficit present.      Mental Status: She is oriented to person, place, and time.      Cranial Nerves: No cranial nerve deficit.   Psychiatric:         Mood and Affect: Mood normal.         Procedures           ED Course                                                       Medical Decision Making  This is a 69-year-old female with history of COPD, hypertension in the emergency room for concerns of chest pain, pleuritic associated with coughing.  X-ray chest negative for acute findings.  Lung sounds within normal limits.  Initial EKG negative for concerns of STEMI.  Significant elevation of blood pressure, she was given 4 mg IV for pain, given nitro sublingual for concerns of elevated blood pressure as well as chest pain.  CTA rule out aortic dissection, PE was ordered.  Troponin was ordered, positive for mild elevated of troponin.  She is pending her second troponin.  There is elevation of brain natruretic peptide as well.  CBC, chemistry at this point nonactionable.  She is otherwise stable    PT is feeling much better. Initial troponin positive at 16, pending repeat. CTA positive for dilated ascending aorta, no dissection. Elevated BNP, will admit for chest pain    Spoke to Dr. Gallo from CT surgery, no immediate intervention, they will follow pt as an inpatient, pending second troponin for admit. Concerns for chest pain, hypertensive urgency. She is stable. No active chest pain    Problems Addressed:  Chest pain, unspecified type: complicated acute illness or injury    Amount and/or Complexity of Data Reviewed  Labs: ordered.  Radiology: ordered.  ECG/medicine tests: ordered.    Risk  Prescription drug management.  Decision regarding hospitalization.        Final diagnoses:   Chest pain, unspecified type       ED Disposition  ED  Disposition       ED Disposition   Decision to Admit    Condition   --    Comment   Level of Care: Telemetry [5]   Diagnosis: Chest pain [844204]   Admitting Physician: ALEX MIGUEL [[id number]]   Attending Physician: ALEX MIGUEL [[id number]]   Certification: I Certify That Inpatient Hospital Services Are Medically Necessary For Greater Than 2 Midnights                 Provider, No Known  [facility] [address]  [phone]    Schedule an appointment as soon as possible for a visit in 1 week(s)  3-5 days         Medication List        New Prescriptions      losartan 50 MG tablet  Commonly known as: COZAAR  Take 1 tablet by mouth Daily for 30 days.     metoprolol tartrate 25 MG tablet  Commonly known as: LOPRESSOR  Take 1 tablet by mouth Every 12 (Twelve) Hours for 30 days.               Where to Get Your Medications        These medications were sent to [facility] [address] [place] [address] - [address] - [phone] [facility] [phone]   [address] [facility] [address]      Phone: [phone]   losartan 50 MG tablet  metoprolol tartrate 25 MG tablet            Carlos Lopez MD  01/28/25 7250

## 2025-01-25 NOTE — CONSULTS
"  Patient Care Team:  Provider, No Known as PCP - General  No ref. provider found  REASON FOR REFERRAL: chest pain   Chief complaint : chest pain     Subjective     Patient is a 69 y.o. female presents with chest pain.  She states this has been ongoing for a couple of days.  She also states \"my lungs hurt.\"  She also describes abdominal pain.  She states pain is relieved by resting and staying still and not moving.  Pain is worsened by moving around in the bed, coughing and deep breathing.  Her chest pain is reproduced with palpation.  She has been given a couple of doses of nitroglycerin and states \"this helped for a minute.\"  She states the pain has been otherwise constant since it started a couple of days ago.  She denies shortness of breath.  She is a smoker.  Troponins are 16 followed by 18 followed by 20.  EKG reveals sinus rhythm with PACs with evidence of LVH, nonspecific ST-T wave abnormalities.  CTA of the chest revealed a dilated thoracic aorta.  PE was ruled out.  She does not take any medications at home.  Chart review indicates that she has a history of hypertension and COPD.  Her blood pressure was acutely elevated on arrival but has improved with treatment.  Losartan has been started per admitting team.  She had a stress echo in 2021 which was reported as low risk but it was noted that she did not achieve target heart rate.    Review of Systems   Review of Systems   Constitutional:  Negative for diaphoresis, fatigue, fever and unexpected weight change.   HENT:  Negative for nosebleeds.    Respiratory:  Positive for cough. Negative for apnea, chest tightness, shortness of breath and wheezing.    Cardiovascular:  Positive for chest pain. Negative for palpitations and leg swelling.   Gastrointestinal:  Positive for abdominal pain and nausea. Negative for abdominal distention and vomiting.   Genitourinary:  Negative for hematuria.   Musculoskeletal:  Negative for gait problem.   Skin:  Negative for color " change.   Neurological:  Negative for dizziness, syncope, weakness and light-headedness.       History  Past Medical History:   Diagnosis Date    ADHD (attention deficit hyperactivity disorder)     COPD (chronic obstructive pulmonary disease)     Hypertension      Past Surgical History:   Procedure Laterality Date    APPENDECTOMY      CHOLECYSTECTOMY      MANDIBLE SURGERY      TONSILLECTOMY       Family History   Problem Relation Age of Onset    No Known Problems Mother     No Known Problems Father      Social History     Tobacco Use    Smoking status: Every Day     Current packs/day: 0.50     Types: Cigarettes    Smokeless tobacco: Never   Vaping Use    Vaping status: Never Used   Substance Use Topics    Alcohol use: Not Currently    Drug use: Defer     No medications prior to admission.       Current Facility-Administered Medications:     acetaminophen (TYLENOL) tablet 650 mg, 650 mg, Oral, Q4H PRN **OR** acetaminophen (TYLENOL) 160 MG/5ML oral solution 650 mg, 650 mg, Oral, Q4H PRN **OR** acetaminophen (TYLENOL) suppository 650 mg, 650 mg, Rectal, Q4H PRN, Lizette Lora MD    HYDROmorphone (DILAUDID) injection 0.5 mg, 0.5 mg, Intravenous, Q3H PRN **AND** naloxone (NARCAN) injection 0.4 mg, 0.4 mg, Intravenous, Q5 Min PRN, Lizette Lora MD    [START ON 1/26/2025] influenza vac split high-dose (FLUZONE HIGH DOSE) injection 0.5 mL, 0.5 mL, Intramuscular, During Hospitalization, Lizette Lora MD    ipratropium-albuterol (DUO-NEB) nebulizer solution 3 mL, 3 mL, Nebulization, 4x Daily - RT, Lizette Lora MD, 3 mL at 01/25/25 0805    labetalol (NORMODYNE,TRANDATE) injection 10 mg, 10 mg, Intravenous, Once PRN, Binu Monsalve MD    Lidocaine 4 % 1 patch, 1 patch, Transdermal, Q24H, Lizette Lora MD, 1 patch at 01/25/25 0830    losartan (COZAAR) tablet 50 mg, 50 mg, Oral, Q24H, Binu Monsalve MD, 50 mg at 01/25/25 0830    Morphine sulfate (PF) injection 1 mg, 1 mg, Intravenous, Q4H PRN **AND** naloxone  (NARCAN) injection 0.4 mg, 0.4 mg, Intravenous, Q5 Min PRN, Lizette Lora MD    nitroglycerin (NITROSTAT) SL tablet 0.4 mg, 0.4 mg, Sublingual, Q5 Min PRN, Lizette Lora MD, 0.4 mg at 01/25/25 0839    ondansetron ODT (ZOFRAN-ODT) disintegrating tablet 4 mg, 4 mg, Oral, Q6H PRN **OR** ondansetron (ZOFRAN) injection 4 mg, 4 mg, Intravenous, Q6H PRN, Lizette Lora MD    sodium chloride 0.9 % flush 10 mL, 10 mL, Intravenous, PRN, Carlos Lopez MD    sodium chloride 0.9 % flush 10 mL, 10 mL, Intravenous, Q12H, Lizette Lora MD, 10 mL at 01/25/25 0830    sodium chloride 0.9 % flush 10 mL, 10 mL, Intravenous, PRNGuido Rami H, MD    sodium chloride 0.9 % infusion 40 mL, 40 mL, Intravenous, PRN, Lizette Lora MD    sodium chloride 0.9 % infusion, 75 mL/hr, Intravenous, Continuous, Lizette Lora MD, Last Rate: 75 mL/hr at 01/25/25 0256, 75 mL/hr at 01/25/25 0256  Allergies:  Patient has no known allergies.    Objective     Vital Signs  Temp:  [98.2 °F (36.8 °C)-99.1 °F (37.3 °C)] 98.2 °F (36.8 °C)  Heart Rate:  [] 83  Resp:  [16-24] 20  BP: (132-208)/() 138/86    Physical Exam:   Vitals and nursing note reviewed.   Constitutional:       General: Not in acute distress.     Appearance: Well-developed and not in distress. Frail. Chronically ill-appearing and acutely ill-appearing. Not diaphoretic.   Neck:      Vascular: No JVD.   Pulmonary:      Effort: Pulmonary effort is normal. No respiratory distress.      Breath sounds: Decreased breath sounds present.   Cardiovascular:      Normal rate. Regular rhythm.      Murmurs: There is no murmur.   Edema:     Peripheral edema absent.   Abdominal:      Tenderness: There is no abdominal tenderness.   Musculoskeletal:      Comments: Chest wall and abdominal discomfort reproduced with palpation.  Skin:     General: Skin is warm and dry.   Neurological:      Mental Status: Alert and oriented to person, place, and time.       Results Review:     Lab Results  (last 72 hours)       Procedure Component Value Units Date/Time    Lipid Panel [[id number]] Collected: 01/25/25 0232    Specimen: Blood Updated: 01/25/25 0945    High Sensitivity Troponin T [[id number]]  (Abnormal) Collected: 01/25/25 0232    Specimen: Blood Updated: 01/25/25 0345     HS Troponin T 20 ng/L     Narrative:      High Sensitive Troponin T Reference Range:  <14.0 ng/L- Negative Female for AMI  <22.0 ng/L- Negative Male for AMI  >=14 - Abnormal Female indicating possible myocardial injury.  >=22 - Abnormal Male indicating possible myocardial injury.   Clinicians would have to utilize clinical acumen, EKG, Troponin, and serial changes to determine if it is an Acute Myocardial Infarction or myocardial injury due to an underlying chronic condition.         Comprehensive Metabolic Panel [[id number]]  (Abnormal) Collected: 01/25/25 0232    Specimen: Blood Updated: 01/25/25 0331     Glucose 104 mg/dL      BUN 15 mg/dL      Creatinine 0.61 mg/dL      Sodium 135 mmol/L      Potassium 3.5 mmol/L      Chloride 100 mmol/L      CO2 25.0 mmol/L      Calcium 8.6 mg/dL      Total Protein 6.5 g/dL      Albumin 3.6 g/dL      ALT (SGPT) 12 U/L      AST (SGOT) 16 U/L      Alkaline Phosphatase 84 U/L      Total Bilirubin 0.5 mg/dL      Globulin 2.9 gm/dL      A/G Ratio 1.2 g/dL      BUN/Creatinine Ratio 24.6     Anion Gap 10.0 mmol/L      eGFR 97.5 mL/min/1.73     Narrative:      GFR Categories in Chronic Kidney Disease (CKD)      GFR Category          GFR (mL/min/1.73)    Interpretation  G1                     90 or greater         Normal or high (1)  G2                      60-89                Mild decrease (1)  G3a                   45-59                Mild to moderate decrease  G3b                   30-44                Moderate to severe decrease  G4                    15-29                Severe decrease  G5                    14 or less           Kidney failure          (1)In the absence of evidence of kidney disease,  neither GFR category G1 or G2 fulfill the criteria for CKD.    eGFR calculation 2021 CKD-EPI creatinine equation, which does not include race as a factor    CBC (No Diff) [[id number]]  (Normal) Collected: 01/25/25 0232    Specimen: Blood Updated: 01/25/25 0310     WBC 8.82 10*3/mm3      RBC 4.77 10*6/mm3      Hemoglobin 12.8 g/dL      Hematocrit 40.2 %      MCV 84.3 fL      MCH 26.8 pg      MCHC 31.8 g/dL      RDW 14.0 %      RDW-SD 42.7 fl      MPV 9.9 fL      Platelets 301 10*3/mm3     High Sensitivity Troponin T 1Hr [[id number]]  (Abnormal) Collected: 01/24/25 2313    Specimen: Blood from Arm, Right Updated: 01/24/25 2347     HS Troponin T 18 ng/L      Troponin T Numeric Delta 2 ng/L      Troponin T % Delta 13    Narrative:      High Sensitive Troponin T Reference Range:  <14.0 ng/L- Negative Female for AMI  <22.0 ng/L- Negative Male for AMI  >=14 - Abnormal Female indicating possible myocardial injury.  >=22 - Abnormal Male indicating possible myocardial injury.   Clinicians would have to utilize clinical acumen, EKG, Troponin, and serial changes to determine if it is an Acute Myocardial Infarction or myocardial injury due to an underlying chronic condition.         Comprehensive Metabolic Panel [[id number]]  (Abnormal) Collected: 01/1955    Specimen: Blood from Arm, Right Updated: 01/24/25 2027     Glucose 95 mg/dL      BUN 16 mg/dL      Creatinine 0.61 mg/dL      Sodium 134 mmol/L      Potassium 3.5 mmol/L      Chloride 100 mmol/L      CO2 23.0 mmol/L      Calcium 8.5 mg/dL      Total Protein 6.5 g/dL      Albumin 3.6 g/dL      ALT (SGPT) 12 U/L      AST (SGOT) 16 U/L      Alkaline Phosphatase 88 U/L      Total Bilirubin 0.4 mg/dL      Globulin 2.9 gm/dL      A/G Ratio 1.2 g/dL      BUN/Creatinine Ratio 26.2     Anion Gap 11.0 mmol/L      eGFR 97.5 mL/min/1.73     Narrative:      GFR Categories in Chronic Kidney Disease (CKD)      GFR Category          GFR (mL/min/1.73)    Interpretation  G1                      90 or greater         Normal or high (1)  G2                      60-89                Mild decrease (1)  G3a                   45-59                Mild to moderate decrease  G3b                   30-44                Moderate to severe decrease  G4                    15-29                Severe decrease  G5                    14 or less           Kidney failure          (1)In the absence of evidence of kidney disease, neither GFR category G1 or G2 fulfill the criteria for CKD.    eGFR calculation 2021 CKD-EPI creatinine equation, which does not include race as a factor    Protime-INR [[id number]]  (Normal) Collected: 01/1955    Specimen: Blood Updated: 01/24/25 2026     Protime 13.9 Seconds      INR 1.02    aPTT [[id number]]  (Normal) Collected: 01/1956    Specimen: Blood Updated: 01/24/25 2026     PTT 34.3 seconds     BNP [[id number]]  (Abnormal) Collected: 01/1956    Specimen: Blood from Arm, Right Updated: 01/24/25 2024     proBNP 1,425.0 pg/mL     Narrative:      This assay is used as an aid in the diagnosis of individuals suspected of having heart failure. It can be used as an aid in the diagnosis of acute decompensated heart failure (ADHF) in patients presenting with signs and symptoms of ADHF to the emergency department (ED). In addition, NT-proBNP of <300 pg/mL indicates ADHF is not likely.    Age Range Result Interpretation  NT-proBNP Concentration (pg/mL:      <50             Positive            >450                   Gray                 300-450                    Negative             <300    50-75           Positive            >900                  Gray                300-900                  Negative            <300      >75             Positive            >1800                  Gray                300-1800                  Negative            <300    High Sensitivity Troponin T [[id number]]  (Abnormal) Collected: 01/1955    Specimen: Blood from Arm, Right Updated: 01/24/25  2024     HS Troponin T 16 ng/L     Narrative:      High Sensitive Troponin T Reference Range:  <14.0 ng/L- Negative Female for AMI  <22.0 ng/L- Negative Male for AMI  >=14 - Abnormal Female indicating possible myocardial injury.  >=22 - Abnormal Male indicating possible myocardial injury.   Clinicians would have to utilize clinical acumen, EKG, Troponin, and serial changes to determine if it is an Acute Myocardial Infarction or myocardial injury due to an underlying chronic condition.         [place] Draw [[id number]] Collected: 01/1955    Specimen: Blood Updated: 01/24/25 2016    Narrative:      The following orders were created for panel order [place] Draw.  Procedure                               Abnormality         Status                     ---------                               -----------         ------                     Green Top (Gel)[[id number]]                                  Final result               Lavender Top[[id number]]                                     Final result               Red Top[[id number]]                                          Final result               Light Blue Top[[id number]]                                   Final result                 Please view results for these tests on the individual orders.    Green Top (Gel) [[id number]] Collected: 01/1955    Specimen: Blood from Arm, Right Updated: 01/24/25 2016     Extra Tube Hold for add-ons.     Comment: Auto resulted.       Lavender Top [[id number]] Collected: 01/1956    Specimen: Blood Updated: 01/24/25 2016     Extra Tube hold for add-on     Comment: Auto resulted       Red Top [[id number]] Collected: 01/1956    Specimen: Blood Updated: 01/24/25 2016     Extra Tube Hold for add-ons.     Comment: Auto resulted.       Light Blue Top [[id number]] Collected: 01/1956    Specimen: Blood Updated: 01/24/25 2016     Extra Tube Hold for add-ons.     Comment: Auto resulted       CBC & Differential [[id number]]   (Abnormal) Collected: 01/1955    Specimen: Blood Updated: 01/24/25 2007    Narrative:      The following orders were created for panel order CBC & Differential.  Procedure                               Abnormality         Status                     ---------                               -----------         ------                     CBC Auto Differential[[id number]]        Abnormal            Final result                 Please view results for these tests on the individual orders.    CBC Auto Differential [[id number]]  (Abnormal) Collected: 01/1955    Specimen: Blood Updated: 01/24/25 2007     WBC 9.84 10*3/mm3      RBC 4.63 10*6/mm3      Hemoglobin 12.7 g/dL      Hematocrit 38.3 %      MCV 82.7 fL      MCH 27.4 pg      MCHC 33.2 g/dL      RDW 14.0 %      RDW-SD 41.5 fl      MPV 9.6 fL      Platelets 292 10*3/mm3      Neutrophil % 70.3 %      Lymphocyte % 16.6 %      Monocyte % 11.5 %      Eosinophil % 0.6 %      Basophil % 0.5 %      Immature Grans % 0.5 %      Neutrophils, Absolute 6.92 10*3/mm3      Lymphocytes, Absolute 1.63 10*3/mm3      Monocytes, Absolute 1.13 10*3/mm3      Eosinophils, Absolute 0.06 10*3/mm3      Basophils, Absolute 0.05 10*3/mm3      Immature Grans, Absolute 0.05 10*3/mm3      nRBC 0.0 /100 WBC             Assessment & Plan     Chest pain: I lean toward this being noncardiac based on her chest wall pain reproduced with palpation and reports of worsening chest discomfort with coughing, also with abdominal discomfort.  However, she did report some improvement with nitroglycerin.  Her troponins are mildly elevated and flat trending and not consistent with acute coronary syndrome.    -Agree with checking 2D echocardiogram.  No plans for ischemic evaluation at present.    2.  Thoracic aortic aneurysm: CT surgery has been consulted, but this can likely be monitored on an outpatient basis.  Agree with initiating efforts to control blood pressure.  Continue ARB.  Consider adding  beta-blocker prior to discharge as well.    3.  Hypertension: Acutely elevated on arrival, now improved.  As noted above losartan has been initiated by admitting physician.    4.  Tobacco abuse: Counseled on the importance of cessation.      I discussed the patient's findings and my recommendations with patient and nursing staff.     Electronically signed by TJ Tanner, 01/25/25, 11:06 AM ANA MARÍA.

## 2025-01-25 NOTE — PLAN OF CARE
Goal Outcome Evaluation:  Plan of Care Reviewed With: patient        Progress: no change  Outcome Evaluation: VSS. BP much improved. Pt cont to c/o persistent epigastric pain, medicated per MAR. Echo done today, EF 61-65%. UDS done as well, + for meth and THC. Pt states she is around people who do meth but unsure how she tested postive. Potential for DC home in AM. Safety maintained. Plan of care ongoing.

## 2025-01-25 NOTE — PLAN OF CARE
Problem: Adult Inpatient Plan of Care  Goal: Plan of Care Review  Reactivated  Goal: Patient-Specific Goal (Individualized)  Reactivated  Goal: Absence of Hospital-Acquired Illness or Injury  Reactivated  Intervention: Identify and Manage Fall Risk  Recent Flowsheet Documentation  Taken 1/25/2025 0130 by Madan Hardwick RN  Safety Promotion/Fall Prevention:   assistive device/personal items within reach   fall prevention program maintained   safety round/check completed  Intervention: Prevent Skin Injury  Recent Flowsheet Documentation  Taken 1/25/2025 0130 by Madan Hardwick RN  Body Position: position changed independently  Skin Protection: silicone foam dressing in place  Intervention: Prevent and Manage VTE (Venous Thromboembolism) Risk  Recent Flowsheet Documentation  Taken 1/25/2025 0130 by Madan Hardwick RN  VTE Prevention/Management: patient refused intervention  Intervention: Prevent Infection  Recent Flowsheet Documentation  Taken 1/25/2025 0130 by Madan Hardwick RN  Infection Prevention:   hand hygiene promoted   rest/sleep promoted   single patient room provided  Goal: Optimal Comfort and Wellbeing  Reactivated  Intervention: Monitor Pain and Promote Comfort  Recent Flowsheet Documentation  Taken 1/25/2025 0127 by Madan Hardwick RN  Pain Management Interventions: pain medication given  Intervention: Provide Person-Centered Care  Recent Flowsheet Documentation  Taken 1/25/2025 0130 by Madan Hardwick RN  Trust Relationship/Rapport: care explained  Goal: Readiness for Transition of Care  Reactivated  Intervention: Mutually Develop Transition Plan  Recent Flowsheet Documentation  Taken 1/25/2025 0341 by Madan Hardwick, RN  Equipment Currently Used at Home: none  Taken 1/25/2025 0146 by Madan Hardwick, RN  Transportation Anticipated: health plan transportation  Transportation Concerns: no car  Patient/Family Anticipated Services at Transition: home health care  Patient/Family Anticipates Transition to: home  with help/services     Problem: Comorbidity Management  Goal: Maintenance of COPD Symptom Control  Reactivated  Intervention: Maintain COPD (Chronic Obstructive Pulmonary Disease) Symptom Control  Recent Flowsheet Documentation  Taken 1/25/2025 0130 by Madan Hardwick RN  Medication Review/Management: medications reviewed  Goal: Maintenance of Heart Failure Symptom Control  Reactivated  Intervention: Maintain Heart Failure Management  Recent Flowsheet Documentation  Taken 1/25/2025 0130 by Madan Hardwick RN  Medication Review/Management: medications reviewed  Goal: Blood Pressure in Desired Range  Reactivated  Intervention: Maintain Blood Pressure Management  Recent Flowsheet Documentation  Taken 1/25/2025 0130 by Madan Hardwick RN  Medication Review/Management: medications reviewed     Goal Outcome Evaluation:  EMR reviewed and POC initiated. Pt presented with CP/abd pain to ED. Transferred to room 434. BP elevated 170s/90s after labetalol given before transfer. Pt complaining of severe midsternal/epigastric pain on arrival. MD notified. IV dilaudid given, with some decrease in pain severity. Admission completed. Pt states she lives alone in an apartment, does not have a support system, no car, and no emergency contact to list. Appropriate consults initiated. NS infusing at 75 ml/hr. Tele SR with PACs noted. Awaiting urine sample for ordered UDS. Monitoring.

## 2025-01-25 NOTE — PROGRESS NOTES
"Adult Nutrition  Assessment/PES    Patient Name:  Prisca Wong  YOB: 1955  MRN: [id number]  Admit Date:  1/24/2025    Assessment Date:  1/25/2025   Reason for Assessment       Row Name 01/25/25 1416          Reason for Assessment    Reason For Assessment physician consult     Diagnosis cardiac disease               Nutrition/Diet History       Row Name 01/25/25 1417          Nutrition/Diet History    Typical Intake (Food/Fluid/EN/PN) Pt in room alone. Dietitian visited early in the morning, but pt was outside of the room. Dietitian returned later in the day, and pt reported being in pain. Pt was observed shaking during the visit. Visit deemed not appropriate at this time. Dietitian will follow up to gather more information. Current diet order: low-sodium cardiac diet. Will monitor.               Labs/Tests/Procedures/Meds       Row Name 01/25/25 1420          Labs/Procedures/Meds    Lab Results Reviewed reviewed     Lab Results Comments Na 135, Glu 105        Diagnostic Tests/Procedures    Diagnostic Test/Procedure Reviewed reviewed        Medications    Pertinent Medications Reviewed reviewed     Pertinent Medications Comments Sodium chloride 75ml/hr               Physical Findings       Row Name 01/25/25 1421          Physical Findings    Overall Physical Appearance John Score 19, missing teeth, NC               Estimated/Assessed Needs - Anthropometrics       Row Name 01/25/25 1421 01/25/25 0932       Anthropometrics    Height -- 172.7 cm (68\")    Weight -- 50.8 kg (112 lb)    Calculation Weight 51 kg (112 lb 7 oz) --       Estimated/Assessed Needs    Additional Documentation KCAL/KG (Group);Protein Requirements (Group);Fluid Requirements (Group) --       KCAL/KG    KCAL/KG 35 Kcal/Kg (kcal);40 Kcal/Kg (kcal) --    35 Kcal/Kg (kcal) 1785 --    40 Kcal/Kg (kcal) 2040 --       Protein Requirements    Weight Used For Protein Calculations 51 kg (112 lb 7 oz) --    Est Protein Requirement Amount " (gms/kg) 1.2 gm protein --    Estimated Protein Requirements (gms/day) 61.2 --       Fluid Requirements    Fluid Requirements (mL/day) 2000 --              Nutrition Prescription Ordered       Row Name 01/25/25 1422          Nutrition Prescription PO    Common Modifiers Cardiac;Low Sodium               Evaluation of Received Nutrient/Fluid Intake       Row Name 01/25/25 1422          Nutrient/Fluid Evaluation    Number of Days Evaluated --  Insufficient data, admitted <24hr        Fluid Intake Evaluation    Oral Fluid (mL) --  Insufficient data, admitted <24hr        PO Evaluation    Number of Days PO Intake Evaluated Insufficient Data              Problem/Interventions:   Problem 1       Row Name 01/25/25 1422          Nutrition Diagnoses Problem 1    Problem 1 Predicted Suboptimal Intake     Etiology (related to) Factors Affecting Nutrition     Condition Pain     Reported/Observed By Patient     Condition Pain               Intervention Goal       Row Name 01/25/25 1423          Intervention Goal    General Meet nutritional needs for age/condition;Disease management/therapy;Reduce/improve symptoms     PO Tolerate PO;Establish PO;Meet estimated needs     Weight Appropriate weight gain               Nutrition Intervention       Row Name 01/25/25 1424          Nutrition Intervention    RD/Tech Action Follow Tx progress;Care plan reviewd               Education/Evaluation       Row Name 01/25/25 1424          Education    Education No discharge needs identified at this time        Monitor/Evaluation    Monitor Per protocol            Electronically signed by:  Rosario Taylor RDN, GENIE  01/25/25 14:24 CST

## 2025-01-26 VITALS
HEART RATE: 69 BPM | DIASTOLIC BLOOD PRESSURE: 89 MMHG | BODY MASS INDEX: 16.97 KG/M2 | OXYGEN SATURATION: 95 % | TEMPERATURE: 97.6 F | WEIGHT: 112 LBS | SYSTOLIC BLOOD PRESSURE: 178 MMHG | RESPIRATION RATE: 16 BRPM | HEIGHT: 68 IN

## 2025-01-26 PROBLEM — R07.9 CHEST PAIN: Status: RESOLVED | Noted: 2021-11-08 | Resolved: 2025-01-26

## 2025-01-26 PROBLEM — K59.00 CONSTIPATION: Status: RESOLVED | Noted: 2021-11-08 | Resolved: 2025-01-26

## 2025-01-26 PROCEDURE — 96375 TX/PRO/DX INJ NEW DRUG ADDON: CPT

## 2025-01-26 PROCEDURE — 94761 N-INVAS EAR/PLS OXIMETRY MLT: CPT

## 2025-01-26 PROCEDURE — 94799 UNLISTED PULMONARY SVC/PX: CPT

## 2025-01-26 PROCEDURE — G0378 HOSPITAL OBSERVATION PER HR: HCPCS

## 2025-01-26 PROCEDURE — 25010000002 KETOROLAC TROMETHAMINE PER 15 MG: Performed by: FAMILY MEDICINE

## 2025-01-26 RX ORDER — METOPROLOL TARTRATE 25 MG/1
25 TABLET, FILM COATED ORAL EVERY 12 HOURS SCHEDULED
Qty: 60 TABLET | Refills: 0 | Status: SHIPPED | OUTPATIENT
Start: 2025-01-26 | End: 2025-02-25

## 2025-01-26 RX ORDER — LOSARTAN POTASSIUM 50 MG/1
50 TABLET ORAL
Qty: 30 TABLET | Refills: 0 | Status: SHIPPED | OUTPATIENT
Start: 2025-01-26 | End: 2025-02-25

## 2025-01-26 RX ADMIN — METOPROLOL TARTRATE 25 MG: 25 TABLET, FILM COATED ORAL at 08:46

## 2025-01-26 RX ADMIN — LOSARTAN POTASSIUM 50 MG: 50 TABLET, FILM COATED ORAL at 08:46

## 2025-01-26 RX ADMIN — IPRATROPIUM BROMIDE AND ALBUTEROL SULFATE 3 ML: .5; 3 SOLUTION RESPIRATORY (INHALATION) at 07:29

## 2025-01-26 RX ADMIN — LIDOCAINE 1 PATCH: 0.04 PATCH TOPICAL at 08:46

## 2025-01-26 RX ADMIN — KETOROLAC TROMETHAMINE 15 MG: 15 INJECTION, SOLUTION INTRAMUSCULAR; INTRAVENOUS at 02:36

## 2025-01-26 RX ADMIN — Medication 10 ML: at 08:46

## 2025-01-26 NOTE — PLAN OF CARE
Problem: Adult Inpatient Plan of Care  Goal: Plan of Care Review  Outcome: Progressing  Flowsheets  Taken 1/26/2025 0546 by Sada Weeks RNA  Progress: no change  Outcome Evaluation: VSS. Gave PRN pain medication for complaints of epigastric pain. Potential D/C home today. S/SA  per telemetry.  Taken 1/25/2025 1628 by Mick Tolentino, RN  Plan of Care Reviewed With: patient  Goal: Patient-Specific Goal (Individualized)  Outcome: Progressing  Goal: Absence of Hospital-Acquired Illness or Injury  Outcome: Progressing  Intervention: Identify and Manage Fall Risk  Recent Flowsheet Documentation  Taken 1/26/2025 0236 by Sada Weeks RNA  Safety Promotion/Fall Prevention: safety round/check completed  Taken 1/26/2025 0000 by Sada Weeks RNA  Safety Promotion/Fall Prevention: safety round/check completed  Taken 1/25/2025 2200 by Sada Weeks RNA  Safety Promotion/Fall Prevention: safety round/check completed  Taken 1/25/2025 2100 by Sada Weeks RNA  Safety Promotion/Fall Prevention: safety round/check completed  Taken 1/25/2025 2000 by Sada Weeks RNA  Safety Promotion/Fall Prevention: safety round/check completed  Intervention: Prevent Skin Injury  Recent Flowsheet Documentation  Taken 1/26/2025 0236 by Sada Weeks RNA  Body Position: position changed independently  Taken 1/26/2025 0000 by Sada Weeks RNA  Body Position: position changed independently  Taken 1/25/2025 2200 by Sada Weeks RNA  Body Position: position changed independently  Taken 1/25/2025 2100 by Sada Weeks RNA  Body Position: position changed independently  Taken 1/25/2025 2000 by Sada Weeks RNA  Body Position: position changed independently  Intervention: Prevent and Manage VTE (Venous Thromboembolism) Risk  Recent Flowsheet Documentation  Taken 1/25/2025 2100 by Sada Weesk RNA  VTE Prevention/Management:   SCDs (sequential compression devices) off   patient refused intervention  Intervention: Prevent Infection  Recent Flowsheet  Documentation  Taken 1/25/2025 2100 by Sada Weeks RNA  Infection Prevention:   hand hygiene promoted   single patient room provided  Goal: Optimal Comfort and Wellbeing  Outcome: Progressing  Intervention: Provide Person-Centered Care  Recent Flowsheet Documentation  Taken 1/25/2025 2100 by Sada Weeks RNA  Trust Relationship/Rapport:   care explained   choices provided   questions encouraged   thoughts/feelings acknowledged  Goal: Readiness for Transition of Care  Outcome: Progressing     Problem: Comorbidity Management  Goal: Maintenance of COPD Symptom Control  Outcome: Progressing  Goal: Maintenance of Heart Failure Symptom Control  Outcome: Progressing  Goal: Blood Pressure in Desired Range  Outcome: Progressing     Problem: Chest Pain  Goal: Resolution of Chest Pain Symptoms  Outcome: Progressing     Problem: Fall Injury Risk  Goal: Absence of Fall and Fall-Related Injury  Outcome: Progressing  Intervention: Promote Injury-Free Environment  Recent Flowsheet Documentation  Taken 1/26/2025 0236 by Sada Weeks RNA  Safety Promotion/Fall Prevention: safety round/check completed  Taken 1/26/2025 0000 by Sada Weeks RNA  Safety Promotion/Fall Prevention: safety round/check completed  Taken 1/25/2025 2200 by Sada Weeks RNA  Safety Promotion/Fall Prevention: safety round/check completed  Taken 1/25/2025 2100 by Sada Weeks RNA  Safety Promotion/Fall Prevention: safety round/check completed  Taken 1/25/2025 2000 by Sada Weeks RNA  Safety Promotion/Fall Prevention: safety round/check completed   Goal Outcome Evaluation:           Progress: no change  Outcome Evaluation: VSS. Gave PRN pain medication for complaints of epigastric pain. Potential D/C home today. S/SA  per telemetry.

## 2025-01-26 NOTE — DISCHARGE SUMMARY
[facility]  DISCHARGE SUMMARY       Date of Admission: 1/24/2025  Date of Discharge:  1/26/2025  Primary Care Physician: Provider, No Known    Presenting Problem/History of Present Illness:  69-year-old female, on no medical treatment for hypertension, history of COPD, with no medical follow-up, came to the hospital reporting chest pain that was present on the anterior chest, not radiated.     Final Discharge Diagnoses:  Active Hospital Problems    Diagnosis     Pulmonary emphysema     Essential hypertension, benign        Consults: Cardiology    Procedures Performed: None    Pertinent Test Results:   Results for orders placed during the hospital encounter of 01/24/25    Adult Transthoracic Echo Complete W/ Cont if Necessary Per Protocol    Interpretation Summary    Left ventricular systolic function is normal. Left ventricular ejection fraction appears to be 61 - 65%.    There is severe calcification of the aortic valve.  Due to this, it is difficult to clearly visualize and thus define its morphology; bicuspidity cannot be excluded.  There is mild to moderate aortic regurgitation, but no significant stenosis.    Left ventricular diastolic function is consistent with (grade I) impaired relaxation.    Estimated right ventricular systolic pressure from tricuspid regurgitation is normal (<35 mmHg).    Normal size and function of the right ventricle.    No other significant (worse than mild) valvular pathology.    Mild dilation of the sinuses of Valsalva is present (4.3 cm).    No previous images available for comparison.      Imaging Results (All)       Procedure Component Value Units Date/Time    CT Angiogram Chest [[id number]] Collected: 01/24/25 2058     Updated: 01/24/25 2105    Narrative:      EXAMINATION: CT ANGIOGRAM CHEST-      1/24/2025 7:35 PM     HISTORY: chest pain     In order to have a CT radiation dose as low as reasonably achievable  Automated  Exposure Control was utilized for adjustment of the mA and/or  KV according to patient size.     CT Dose DLP = 118.97 mGy.cm.  (If there are multiple studies performed at the same time this  represents the total dose).     CT angiogram chest with IV contrast injection.  CT angiography protocol.  CT imaging with bolus IV contrast injection.  Under concurrent supervision axial, sagittal, coronal,  three-dimensional, and MIP data sets were constructed on an independent  work station.     Comparison:  11/7/2021 chest CT.     Heart size is within normal limits.  Dilated ascending thoracic aorta measuring 44 x 47 mm.  No dissection.     Symmetric and normally opacified pulmonary arteries.  No pulmonary embolism.     No mediastinal mass.     Hyperexpanded lungs.  Diffuse emphysema.  No pneumonia, pneumothorax, or pleural effusion.     Moderate degenerative disc and endplate change throughout the thoracic  spine.       Impression:      1. Dilated ascending thoracic aorta. Follow-up cardiology consult  recommended regarding hypertension and aortic valve assessment.  2. No pulmonary embolism.  3. Prominent diffuse chronic lung changes.     This report was signed and finalized on 1/24/2025 9:01 PM by Dr. Thony Bearden MD.       XR Chest 1 View [[id number]] Collected: 01/24/25 2032     Updated: 01/24/25 2036    Narrative:      EXAMINATION: XR CHEST 1 VW-     1/24/2025 7:24 PM     HISTORY: Shortness of breath.     1 view chest x-ray.     COMPARISON:  11/7/2021.     Heart size is within normal limits.     Chronic lung change with mild hyperexpansion.     LEFT diaphragm elevation.     No pneumonia, pneumothorax, or congestive failure.       Impression:      1. Chronic lung changes.  2. No focal acute infiltrate.           This report was signed and finalized on 1/24/2025 8:33 PM by Dr. Thony Bearden MD.             LAB RESULTS:      Lab 01/25/25  0232 01/1955   WBC 8.82 9.84   HEMOGLOBIN 12.8 12.7   HEMATOCRIT 40.2 38.3    PLATELETS 301 292   NEUTROS ABS  --  6.92   IMMATURE GRANS (ABS)  --  0.05   LYMPHS ABS  --  1.63   MONOS ABS  --  1.13*   EOS ABS  --  0.06   MCV 84.3 82.7   PROTIME  --  13.9   APTT  --  34.3         Lab 01/25/25  0232 01/1955   SODIUM 135* 134*   POTASSIUM 3.5 3.5   CHLORIDE 100 100   CO2 25.0 23.0   ANION GAP 10.0 11.0   BUN 15 16   CREATININE 0.61 0.61   EGFR 97.5 97.5   GLUCOSE 104* 95   CALCIUM 8.6 8.5*         Lab 01/25/25  0232 01/1955   TOTAL PROTEIN 6.5 6.5   ALBUMIN 3.6 3.6   GLOBULIN 2.9 2.9   ALT (SGPT) 12 12   AST (SGOT) 16 16   BILIRUBIN 0.5 0.4   ALK PHOS 84 88         Lab 01/25/25  0232 01/24/25 2313 01/1955   PROBNP  --   --  1,425.0*   HSTROP T 20* 18* 16*   PROTIME  --   --  13.9   INR  --   --  1.02         Lab 01/25/25 0232   CHOLESTEROL 145   LDL CHOL 71   HDL CHOL 59   TRIGLYCERIDES 76             Brief Urine Lab Results       None          Microbiology Results (last 10 days)       ** No results found for the last 240 hours. **            Hospital Course: Patient was admitted for medical management.  Initial imaging studies with a CT angiogram of the chest showed emphysema, no pulmonary embolism, no infiltrates.  4.7 cm ascending aortic aneurysm, no dissection or thrombosis.  Patient found hypertensive.  She was not taking medications or had follow-up with medical providers.  She was started on antihypertensive medication with losartan and metoprolol.  Blood pressure improved.  Patient was evaluated by cardiology no invasive procedures recommended.;  Regarding methamphetamine use, she was advised cessation due to possible complications, including worsening aneurysm, dissection, rupture, myocardial infarction, worsening hypertension and stroke, among others.  Also advised patient to follow with primary care provider closely to monitor blood pressure management response.  Echo cardiogram showed possible bicuspid aortic valve.  There were no findings of valvular  "stenosis.  Ejection fraction was preserved.  The patient had adequate progress.  On reassessment, no chest pain reported.  Outpatient cardiology follow-up recommended.      Physical Exam on Discharge:  /89 (BP Location: Right arm, Patient Position: Lying)   Pulse 69   Temp 97.6 °F (36.4 °C) (Oral)   Resp 16   Ht 172.7 cm (68\")   Wt 50.8 kg (112 lb)   SpO2 95%   BMI 17.03 kg/m²   Physical Exam  Constitutional:       Appearance: Anxious, alert, oriented x 3.  No respiratory distress  HENT:      Head: Normocephalic and atraumatic.      Nose: Nose normal.   Eyes:      Extraocular Movements: Extraocular movements intact.      Conjunctiva/sclera: Conjunctivae normal.      Pupils: Pupils are equal, round  Cardiovascular:      Rate and Rhythm: Normal rate and regular rhythm.      Pulses: Normal pulses.   Pulmonary:      Effort: No respiratory distress.      Breath sounds: Symmetric breath sounds.  Prolonged expiratory phase.  Abdominal:      General: Abdomen is flat. Bowel sounds are normal.      Palpations: Abdomen is soft.   Extremities:  No lower extremity edema.  Skin:     Capillary Refill: Capillary refill takes less than 2 seconds.      Coloration: Skin is not jaundiced.   Neurological:      General: No focal deficit present.      Mental Status: Patient is alert, oriented to place time and person.       Condition on Discharge: Stable    Discharge Disposition:  Home or Self Care    Discharge Medications:     Discharge Medications        New Medications        Instructions Start Date   losartan 50 MG tablet  Commonly known as: COZAAR   50 mg, Oral, Every 24 Hours Scheduled      metoprolol tartrate 25 MG tablet  Commonly known as: LOPRESSOR   25 mg, Oral, Every 12 Hours Scheduled               Discharge Diet:   Diet Instructions       Diet: Cardiac Diets; Low Sodium (2g); Regular (IDDSI 7); Thin (IDDSI 0)      Discharge Diet: Cardiac Diets    Cardiac Diet: Low Sodium (2g)    Texture: Regular (IDDSI 7)    " Fluid Consistency: Thin (IDDSI 0)            Activity at Discharge: As tolerated    Follow-up Appointments:   No future appointments.    Test Results Pending at Discharge: None    Electronically signed by Binu Monsalve MD, 01/26/25, 09:31 CST.    Time: 45 minutes.

## 2025-01-27 LAB
QT INTERVAL: 366 MS
QT INTERVAL: 462 MS
QTC INTERVAL: 447 MS
QTC INTERVAL: 555 MS

## 2025-01-28 LAB
QT INTERVAL: 378 MS
QTC INTERVAL: 475 MS

## 2025-02-01 ENCOUNTER — APPOINTMENT (OUTPATIENT)
Dept: CT IMAGING | Facility: HOSPITAL | Age: 70
End: 2025-02-01
Payer: MEDICARE

## 2025-02-01 ENCOUNTER — APPOINTMENT (OUTPATIENT)
Dept: GENERAL RADIOLOGY | Facility: HOSPITAL | Age: 70
End: 2025-02-01
Payer: MEDICARE

## 2025-02-01 ENCOUNTER — HOSPITAL ENCOUNTER (EMERGENCY)
Facility: HOSPITAL | Age: 70
Discharge: HOME | End: 2025-02-01
Attending: EMERGENCY MEDICINE
Payer: MEDICARE

## 2025-02-01 VITALS
RESPIRATION RATE: 20 BRPM | HEIGHT: 68 IN | DIASTOLIC BLOOD PRESSURE: 96 MMHG | OXYGEN SATURATION: 93 % | WEIGHT: 113.5 LBS | BODY MASS INDEX: 17.2 KG/M2 | SYSTOLIC BLOOD PRESSURE: 165 MMHG | HEART RATE: 68 BPM | TEMPERATURE: 97.7 F

## 2025-02-01 DIAGNOSIS — R03.0 ELEVATED BLOOD PRESSURE READING: ICD-10-CM

## 2025-02-01 DIAGNOSIS — R06.00 DYSPNEA, UNSPECIFIED TYPE: Primary | ICD-10-CM

## 2025-02-01 LAB
ALBUMIN SERPL-MCNC: 3.7 G/DL (ref 3.5–5.2)
ALBUMIN/GLOB SERPL: 1.2 G/DL
ALP SERPL-CCNC: 127 U/L (ref 39–117)
ALT SERPL W P-5'-P-CCNC: 18 U/L (ref 1–33)
AMPHET+METHAMPHET UR QL: NEGATIVE
AMPHETAMINES UR QL: NEGATIVE
ANION GAP SERPL CALCULATED.3IONS-SCNC: 8 MMOL/L (ref 5–15)
ARTERIAL PATENCY WRIST A: ABNORMAL
AST SERPL-CCNC: 25 U/L (ref 1–32)
ATMOSPHERIC PRESS: 757 MMHG
B PARAPERT DNA SPEC QL NAA+PROBE: NOT DETECTED
B PERT DNA SPEC QL NAA+PROBE: NOT DETECTED
BARBITURATES UR QL SCN: NEGATIVE
BASE EXCESS BLDA CALC-SCNC: 6.9 MMOL/L (ref 0–2)
BASOPHILS # BLD AUTO: 0.06 10*3/MM3 (ref 0–0.2)
BASOPHILS NFR BLD AUTO: 1 % (ref 0–1.5)
BDY SITE: ABNORMAL
BENZODIAZ UR QL SCN: POSITIVE
BILIRUB SERPL-MCNC: <0.2 MG/DL (ref 0–1.2)
BODY TEMPERATURE: 37
BUN SERPL-MCNC: 22 MG/DL (ref 8–23)
BUN/CREAT SERPL: 35.5 (ref 7–25)
BUPRENORPHINE SERPL-MCNC: NEGATIVE NG/ML
C PNEUM DNA NPH QL NAA+NON-PROBE: NOT DETECTED
CALCIUM SPEC-SCNC: 9.3 MG/DL (ref 8.6–10.5)
CANNABINOIDS SERPL QL: NEGATIVE
CHLORIDE SERPL-SCNC: 104 MMOL/L (ref 98–107)
CO2 SERPL-SCNC: 30 MMOL/L (ref 22–29)
COCAINE UR QL: NEGATIVE
CREAT SERPL-MCNC: 0.62 MG/DL (ref 0.57–1)
D-LACTATE SERPL-SCNC: 0.8 MMOL/L (ref 0.5–2)
DEPRECATED RDW RBC AUTO: 44.3 FL (ref 37–54)
EGFRCR SERPLBLD CKD-EPI 2021: 96.5 ML/MIN/1.73
EOSINOPHIL # BLD AUTO: 0.23 10*3/MM3 (ref 0–0.4)
EOSINOPHIL NFR BLD AUTO: 3.7 % (ref 0.3–6.2)
ERYTHROCYTE [DISTWIDTH] IN BLOOD BY AUTOMATED COUNT: 14.2 % (ref 12.3–15.4)
FENTANYL UR-MCNC: NEGATIVE NG/ML
FLUAV SUBTYP SPEC NAA+PROBE: NOT DETECTED
FLUBV RNA ISLT QL NAA+PROBE: NOT DETECTED
GAS FLOW AIRWAY: 2 LPM
GEN 5 1HR TROPONIN T REFLEX: 14 NG/L
GLOBULIN UR ELPH-MCNC: 3 GM/DL
GLUCOSE SERPL-MCNC: 92 MG/DL (ref 65–99)
HADV DNA SPEC NAA+PROBE: NOT DETECTED
HCO3 BLDA-SCNC: 32.8 MMOL/L (ref 20–26)
HCOV 229E RNA SPEC QL NAA+PROBE: NOT DETECTED
HCOV HKU1 RNA SPEC QL NAA+PROBE: NOT DETECTED
HCOV NL63 RNA SPEC QL NAA+PROBE: NOT DETECTED
HCOV OC43 RNA SPEC QL NAA+PROBE: NOT DETECTED
HCT VFR BLD AUTO: 40.6 % (ref 34–46.6)
HGB BLD-MCNC: 12.7 G/DL (ref 12–15.9)
HMPV RNA NPH QL NAA+NON-PROBE: NOT DETECTED
HPIV1 RNA ISLT QL NAA+PROBE: NOT DETECTED
HPIV2 RNA SPEC QL NAA+PROBE: NOT DETECTED
HPIV3 RNA NPH QL NAA+PROBE: NOT DETECTED
HPIV4 P GENE NPH QL NAA+PROBE: NOT DETECTED
IMM GRANULOCYTES # BLD AUTO: 0.02 10*3/MM3 (ref 0–0.05)
IMM GRANULOCYTES NFR BLD AUTO: 0.3 % (ref 0–0.5)
LYMPHOCYTES # BLD AUTO: 1.78 10*3/MM3 (ref 0.7–3.1)
LYMPHOCYTES NFR BLD AUTO: 28.5 % (ref 19.6–45.3)
Lab: ABNORMAL
M PNEUMO IGG SER IA-ACNC: NOT DETECTED
MCH RBC QN AUTO: 26.9 PG (ref 26.6–33)
MCHC RBC AUTO-ENTMCNC: 31.3 G/DL (ref 31.5–35.7)
MCV RBC AUTO: 86 FL (ref 79–97)
METHADONE UR QL SCN: NEGATIVE
MODALITY: ABNORMAL
MONOCYTES # BLD AUTO: 0.41 10*3/MM3 (ref 0.1–0.9)
MONOCYTES NFR BLD AUTO: 6.6 % (ref 5–12)
NEUTROPHILS NFR BLD AUTO: 3.74 10*3/MM3 (ref 1.7–7)
NEUTROPHILS NFR BLD AUTO: 59.9 % (ref 42.7–76)
NRBC BLD AUTO-RTO: 0 /100 WBC (ref 0–0.2)
NT-PROBNP SERPL-MCNC: 412.9 PG/ML (ref 0–900)
OPIATES UR QL: NEGATIVE
OXYCODONE UR QL SCN: NEGATIVE
PCO2 BLDA: 51.6 MM HG (ref 35–45)
PCO2 TEMP ADJ BLD: 51.6 MM HG (ref 35–45)
PCP UR QL SCN: NEGATIVE
PH BLDA: 7.41 PH UNITS (ref 7.35–7.45)
PH, TEMP CORRECTED: 7.41 PH UNITS (ref 7.35–7.45)
PLATELET # BLD AUTO: 312 10*3/MM3 (ref 140–450)
PMV BLD AUTO: 9.5 FL (ref 6–12)
PO2 BLDA: 77.7 MM HG (ref 83–108)
PO2 TEMP ADJ BLD: 77.7 MM HG (ref 83–108)
POTASSIUM SERPL-SCNC: 4.5 MMOL/L (ref 3.5–5.2)
PROCALCITONIN SERPL-MCNC: 0.08 NG/ML (ref 0–0.25)
PROT SERPL-MCNC: 6.7 G/DL (ref 6–8.5)
RBC # BLD AUTO: 4.72 10*6/MM3 (ref 3.77–5.28)
RHINOVIRUS RNA SPEC NAA+PROBE: NOT DETECTED
RSV RNA NPH QL NAA+NON-PROBE: NOT DETECTED
SAO2 % BLDCOA: 96.2 % (ref 94–99)
SARS-COV-2 RNA RESP QL NAA+PROBE: NOT DETECTED
SODIUM SERPL-SCNC: 142 MMOL/L (ref 136–145)
TRICYCLICS UR QL SCN: NEGATIVE
TROPONIN T % DELTA: 0
TROPONIN T NUMERIC DELTA: 0 NG/L
TROPONIN T SERPL HS-MCNC: 14 NG/L
VENTILATOR MODE: ABNORMAL
WBC NRBC COR # BLD AUTO: 6.24 10*3/MM3 (ref 3.4–10.8)

## 2025-02-01 PROCEDURE — 71275 CT ANGIOGRAPHY CHEST: CPT

## 2025-02-01 PROCEDURE — 0202U NFCT DS 22 TRGT SARS-COV-2: CPT | Performed by: EMERGENCY MEDICINE

## 2025-02-01 PROCEDURE — 82803 BLOOD GASES ANY COMBINATION: CPT

## 2025-02-01 PROCEDURE — 25510000001 IOPAMIDOL PER 1 ML: Performed by: FAMILY MEDICINE

## 2025-02-01 PROCEDURE — 25010000002 LABETALOL 5 MG/ML SOLUTION: Performed by: EMERGENCY MEDICINE

## 2025-02-01 PROCEDURE — 96374 THER/PROPH/DIAG INJ IV PUSH: CPT

## 2025-02-01 PROCEDURE — 80307 DRUG TEST PRSMV CHEM ANLYZR: CPT | Performed by: EMERGENCY MEDICINE

## 2025-02-01 PROCEDURE — 25010000002 ONDANSETRON PER 1 MG: Performed by: EMERGENCY MEDICINE

## 2025-02-01 PROCEDURE — 94640 AIRWAY INHALATION TREATMENT: CPT

## 2025-02-01 PROCEDURE — 80053 COMPREHEN METABOLIC PANEL: CPT | Performed by: EMERGENCY MEDICINE

## 2025-02-01 PROCEDURE — 93005 ELECTROCARDIOGRAM TRACING: CPT | Performed by: EMERGENCY MEDICINE

## 2025-02-01 PROCEDURE — 85025 COMPLETE CBC W/AUTO DIFF WBC: CPT | Performed by: EMERGENCY MEDICINE

## 2025-02-01 PROCEDURE — 83880 ASSAY OF NATRIURETIC PEPTIDE: CPT | Performed by: EMERGENCY MEDICINE

## 2025-02-01 PROCEDURE — 84484 ASSAY OF TROPONIN QUANT: CPT | Performed by: EMERGENCY MEDICINE

## 2025-02-01 PROCEDURE — 96375 TX/PRO/DX INJ NEW DRUG ADDON: CPT

## 2025-02-01 PROCEDURE — 94799 UNLISTED PULMONARY SVC/PX: CPT

## 2025-02-01 PROCEDURE — 83605 ASSAY OF LACTIC ACID: CPT | Performed by: EMERGENCY MEDICINE

## 2025-02-01 PROCEDURE — 71045 X-RAY EXAM CHEST 1 VIEW: CPT

## 2025-02-01 PROCEDURE — 36600 WITHDRAWAL OF ARTERIAL BLOOD: CPT

## 2025-02-01 PROCEDURE — 36415 COLL VENOUS BLD VENIPUNCTURE: CPT

## 2025-02-01 PROCEDURE — 25010000002 MORPHINE PER 10 MG: Performed by: EMERGENCY MEDICINE

## 2025-02-01 PROCEDURE — 94761 N-INVAS EAR/PLS OXIMETRY MLT: CPT

## 2025-02-01 PROCEDURE — 99285 EMERGENCY DEPT VISIT HI MDM: CPT

## 2025-02-01 PROCEDURE — 84145 PROCALCITONIN (PCT): CPT | Performed by: EMERGENCY MEDICINE

## 2025-02-01 RX ORDER — ONDANSETRON 2 MG/ML
4 INJECTION INTRAMUSCULAR; INTRAVENOUS ONCE
Status: COMPLETED | OUTPATIENT
Start: 2025-02-01 | End: 2025-02-01

## 2025-02-01 RX ORDER — ALBUTEROL SULFATE 1.25 MG/3ML
1 SOLUTION RESPIRATORY (INHALATION) EVERY 6 HOURS PRN
Qty: 30 EACH | Refills: 0 | Status: SHIPPED | OUTPATIENT
Start: 2025-02-01

## 2025-02-01 RX ORDER — IPRATROPIUM BROMIDE AND ALBUTEROL SULFATE 2.5; .5 MG/3ML; MG/3ML
3 SOLUTION RESPIRATORY (INHALATION) ONCE
Status: COMPLETED | OUTPATIENT
Start: 2025-02-01 | End: 2025-02-01

## 2025-02-01 RX ORDER — ALBUTEROL SULFATE 0.83 MG/ML
2.5 SOLUTION RESPIRATORY (INHALATION) ONCE
Status: COMPLETED | OUTPATIENT
Start: 2025-02-01 | End: 2025-02-01

## 2025-02-01 RX ORDER — LABETALOL HYDROCHLORIDE 5 MG/ML
20 INJECTION, SOLUTION INTRAVENOUS ONCE
Status: COMPLETED | OUTPATIENT
Start: 2025-02-01 | End: 2025-02-01

## 2025-02-01 RX ORDER — NIFEDIPINE 10 MG/1
10 CAPSULE ORAL ONCE
Status: DISCONTINUED | OUTPATIENT
Start: 2025-02-01 | End: 2025-02-01

## 2025-02-01 RX ORDER — IOPAMIDOL 755 MG/ML
100 INJECTION, SOLUTION INTRAVASCULAR
Status: COMPLETED | OUTPATIENT
Start: 2025-02-01 | End: 2025-02-01

## 2025-02-01 RX ORDER — PREDNISONE 50 MG/1
50 TABLET ORAL DAILY
Qty: 5 TABLET | Refills: 0 | Status: SHIPPED | OUTPATIENT
Start: 2025-02-01 | End: 2025-02-06

## 2025-02-01 RX ADMIN — IPRATROPIUM BROMIDE AND ALBUTEROL SULFATE 3 ML: .5; 3 SOLUTION RESPIRATORY (INHALATION) at 15:50

## 2025-02-01 RX ADMIN — ONDANSETRON 4 MG: 2 INJECTION INTRAMUSCULAR; INTRAVENOUS at 17:49

## 2025-02-01 RX ADMIN — IOPAMIDOL 100 ML: 755 INJECTION, SOLUTION INTRAVENOUS at 18:27

## 2025-02-01 RX ADMIN — LABETALOL HYDROCHLORIDE 20 MG: 5 INJECTION, SOLUTION INTRAVENOUS at 17:50

## 2025-02-01 RX ADMIN — ALBUTEROL SULFATE 2.5 MG: 2.5 SOLUTION RESPIRATORY (INHALATION) at 15:50

## 2025-02-01 RX ADMIN — MORPHINE SULFATE 4 MG: 4 INJECTION, SOLUTION INTRAMUSCULAR; INTRAVENOUS at 17:53

## 2025-02-01 NOTE — ED PROVIDER NOTES
Subjective   History of Present Illness  Has got history of COPD recently admitted to the hospital with COPD exacerbation CT of the chest has shown dilated aorta and a possible bicuspid arctic valve on the echocardiogram findings today she is short of breath and having some cough.  Very poor historian.  Denies any chest pain    Shortness of Breath  Severity:  Moderate  Onset quality:  Gradual  Timing:  Constant  Progression:  Worsening  Chronicity:  Recurrent  Context: activity    Context: not animal exposure, not emotional upset, not occupational exposure, not pollens and not URI    Relieved by:  Nothing  Worsened by:  Nothing  Ineffective treatments:  None tried  Associated symptoms: cough and wheezing    Associated symptoms: no abdominal pain, no chest pain, no claudication, no headaches, no hemoptysis, no neck pain, no rash, no sputum production, no syncope and no swollen glands    Associated symptoms comment:  Appears to have chest wall pain rating to the back.  Risk factors: no recent alcohol use        Review of Systems   Constitutional: Negative.    HENT: Negative.     Eyes: Negative.    Respiratory:  Positive for cough, shortness of breath and wheezing. Negative for hemoptysis and sputum production.    Cardiovascular: Negative.  Negative for chest pain, claudication and syncope.   Gastrointestinal: Negative.  Negative for abdominal pain.   Musculoskeletal: Negative.  Negative for back pain and neck pain.   Skin: Negative.  Negative for rash.   Neurological: Negative.  Negative for headaches.   All other systems reviewed and are negative.      Past Medical History:   Diagnosis Date    ADHD (attention deficit hyperactivity disorder)     COPD (chronic obstructive pulmonary disease)     Hypertension        No Known Allergies    Past Surgical History:   Procedure Laterality Date    APPENDECTOMY      CHOLECYSTECTOMY      MANDIBLE SURGERY      TONSILLECTOMY         Family History   Problem Relation Age of Onset     No Known Problems Mother     No Known Problems Father        Social History     Socioeconomic History    Marital status: Single   Tobacco Use    Smoking status: Every Day     Current packs/day: 0.50     Types: Cigarettes    Smokeless tobacco: Never   Vaping Use    Vaping status: Never Used   Substance and Sexual Activity    Alcohol use: Not Currently    Drug use: Defer    Sexual activity: Defer           Objective   Physical Exam  Vitals and nursing note reviewed. Exam conducted with a chaperone present.   Constitutional:       General: She is not in acute distress.     Appearance: She is well-developed. She is cachectic. She is ill-appearing. She is not toxic-appearing or diaphoretic.      Comments: Chronically ill-appearing   HENT:      Head: Normocephalic and atraumatic.      Right Ear: External ear normal.      Nose: Nose normal.      Mouth/Throat:      Mouth: Mucous membranes are moist.   Eyes:      Conjunctiva/sclera: Conjunctivae normal.      Pupils: Pupils are equal, round, and reactive to light.   Cardiovascular:      Rate and Rhythm: Normal rate and regular rhythm.      Chest Wall: PMI is not displaced.      Pulses: Normal pulses. No decreased pulses.      Heart sounds: Normal heart sounds. No murmur heard.     No diastolic murmur is present.      Comments: Hypertensive  Pulmonary:      Effort: Pulmonary effort is normal. Tachypnea present. No accessory muscle usage or respiratory distress.      Breath sounds: Normal breath sounds. No stridor. Examination of the right-lower field reveals wheezing. Examination of the left-lower field reveals wheezing. No decreased breath sounds, wheezing, rhonchi or rales.   Chest:      Chest wall: No tenderness or crepitus.   Abdominal:      General: Bowel sounds are normal. There is no distension.      Palpations: Abdomen is soft.      Tenderness: There is no abdominal tenderness.      Comments: No abdominal bruits   Musculoskeletal:         General: No swelling or  tenderness. Normal range of motion.      Cervical back: Normal range of motion and neck supple. No rigidity.      Right lower leg: No tenderness. No edema.      Left lower leg: No tenderness. No edema.      Comments: Lower extremity exam bilaterally is unremarkable.  There is no right or left calf tenderness .  There is no palpable venous cord.  No obvious difference in the size of the legs.  No pitting edema.  The dorsalis pedis and posterior tibial femoral and popliteal pulses are palpable and +2 bilaterally.  Homans sign is negative   Skin:     General: Skin is warm.      Capillary Refill: Capillary refill takes less than 2 seconds.      Coloration: Skin is not cyanotic or jaundiced.      Findings: No erythema or rash.   Neurological:      General: No focal deficit present.      Mental Status: She is alert and oriented to person, place, and time. Mental status is at baseline. She is confused.      GCS: GCS eye subscore is 4. GCS verbal subscore is 5. GCS motor subscore is 5.      Cranial Nerves: Cranial nerves 2-12 are intact. No cranial nerve deficit or facial asymmetry.      Motor: Motor function is intact. No weakness.      Coordination: Coordination normal.      Deep Tendon Reflexes: Reflexes are normal and symmetric. Reflexes normal.   Psychiatric:         Mood and Affect: Mood normal. Mood is anxious.         Procedures           ED Course  ED Course as of 02/01/25 1735   Sat Feb 01, 2025   1731 This patient has multiple visits to the ED for similar complaints different complaints today she is hypertensive and complaining of some chest wall pain and shortness of breath she wants to be admitted to the hospital but does not want to go to a nursing home she said that she lives by herself sometimes difficult for her to make food for supper but she did refuse to go to a nursing home or assisted living.  She is having some chest wall pain which is nonspecific EKG does not show any evidence of acute STEMI.  Blood  pressure is elevated he was given some neb treatments in the ED and morphine for the pain. [TS]   1732 When I have gone to the patient's room she is usually sleeping but then she starts complaining of pain when he talk to her.  Her last echo has shown a bicuspid aortic valve and aortic dilatation and a CT chest there for the possibly of dissection cannot be totally ruled out I have asked nursing staff to give her some antihypertensives.  Will get a CT of the chest to rule out a dissection.  And reevaluate and reassess. [TS]   1733 Turned over to Dr. Hernandez [TS]   1733 I have asked nursing staff again to get an EKG on this patient. [TS]      ED Course User Index  [TS] Kun Pryor MD                                                       Medical Decision Making  Differential Diagnosis:  I considered pulmonary etiology, asthma, chronic obstructive pulmonary disease, pneumonia, pulmonary embolism, adult respiratory distress syndrome, pneumothorax, pleural effusion, pulmonary fibrosis, cardiac etiology, congestive heart failure, myocardial infarction, metabolic etiology, diabetic ketoacidosis, uremia, acidosis, sepsis, anemia, drug related etiology, hyperventilation and CNS disease as a possible cause of dyspnea in this patient. This is a partial list of diagnoses considered.           Amount and/or Complexity of Data Reviewed  Labs: ordered.  Radiology: ordered.  ECG/medicine tests: ordered.    Risk  Prescription drug management.        Final diagnoses:   Dyspnea, unspecified type   Elevated blood pressure reading       ED Disposition  ED Disposition       None            No follow-up provider specified.       Medication List      No changes were made to your prescriptions during this visit.            Kun Pryor MD  02/01/25 1539       Kun Pryor MD  02/01/25 1543       Kun Pryor MD  02/01/25 1738

## 2025-02-02 NOTE — ED PROVIDER NOTES
Subjective   History of Present Illness    Review of Systems    Past Medical History:   Diagnosis Date    ADHD (attention deficit hyperactivity disorder)     COPD (chronic obstructive pulmonary disease)     Hypertension        No Known Allergies    Past Surgical History:   Procedure Laterality Date    APPENDECTOMY      CHOLECYSTECTOMY      MANDIBLE SURGERY      TONSILLECTOMY         Family History   Problem Relation Age of Onset    No Known Problems Mother     No Known Problems Father        Social History     Socioeconomic History    Marital status: Single   Tobacco Use    Smoking status: Every Day     Current packs/day: 0.50     Types: Cigarettes    Smokeless tobacco: Never   Vaping Use    Vaping status: Never Used   Substance and Sexual Activity    Alcohol use: Not Currently    Drug use: Defer    Sexual activity: Defer           Objective   Physical Exam    Procedures           ED Course  ED Course as of 02/01/25 2139   Sat Feb 01, 2025   1731 This patient has multiple visits to the ED for similar complaints different complaints today she is hypertensive and complaining of some chest wall pain and shortness of breath she wants to be admitted to the hospital but does not want to go to a nursing home she said that she lives by herself sometimes difficult for her to make food for supper but she did refuse to go to a nursing home or assisted living.  She is having some chest wall pain which is nonspecific EKG does not show any evidence of acute STEMI.  Blood pressure is elevated he was given some neb treatments in the ED and morphine for the pain. [TS]   1732 When I have gone to the patient's room she is usually sleeping but then she starts complaining of pain when he talk to her.  Her last echo has shown a bicuspid aortic valve and aortic dilatation and a CT chest there for the possibly of dissection cannot be totally ruled out I have asked nursing staff to give her some antihypertensives.  Will get a CT of the  chest to rule out a dissection.  And reevaluate and reassess. [TS]   1733 Turned over to Dr. Hernadnez [TS]   1733 I have asked nursing staff again to get an EKG on this patient. [TS]      ED Course User Index  [TS] Kun Pryor MD                                                     Lab Results (last 24 hours)       Procedure Component Value Units Date/Time    Blood Gas, Arterial - [[id number]]  (Abnormal) Collected: 02/01/25 1556    Specimen: Arterial Blood Updated: 02/01/25 1558     Site Left Radial     Roberth's Test N/A     pH, Arterial 7.412 pH units      pCO2, Arterial 51.6 mm Hg      Comment: 83 Value above reference range        pO2, Arterial 77.7 mm Hg      Comment: 84 Value below reference range        HCO3, Arterial 32.8 mmol/L      Comment: 83 Value above reference range        Base Excess, Arterial 6.9 mmol/L      Comment: 83 Value above reference range        O2 Saturation, Arterial 96.2 %      Temperature 37.0     Barometric Pressure for Blood Gas 757 mmHg      Modality Nasal Cannula     Flow Rate 2.0 lpm      Ventilator Mode NA     Collected by [id number]     Comment: Meter: [id number]     :  Ryann King, VIOLETA        pCO2, Temperature Corrected 51.6 mm Hg      pH, Temp Corrected 7.412 pH Units      pO2, Temperature Corrected 77.7 mm Hg     CBC & Differential [[id number]]  (Abnormal) Collected: 02/01/25 1610    Specimen: Blood Updated: 02/01/25 1633    Narrative:      The following orders were created for panel order CBC & Differential.  Procedure                               Abnormality         Status                     ---------                               -----------         ------                     CBC Auto Differential[[id number]]        Abnormal            Final result                 Please view results for these tests on the individual orders.    Comprehensive Metabolic Panel [[id number]]  (Abnormal) Collected: 02/01/25 1610    Specimen: Blood Updated: 02/01/25 1655     Glucose  92 mg/dL      BUN 22 mg/dL      Creatinine 0.62 mg/dL      Sodium 142 mmol/L      Potassium 4.5 mmol/L      Chloride 104 mmol/L      CO2 30.0 mmol/L      Calcium 9.3 mg/dL      Total Protein 6.7 g/dL      Albumin 3.7 g/dL      ALT (SGPT) 18 U/L      AST (SGOT) 25 U/L      Alkaline Phosphatase 127 U/L      Total Bilirubin <0.2 mg/dL      Globulin 3.0 gm/dL      A/G Ratio 1.2 g/dL      BUN/Creatinine Ratio 35.5     Anion Gap 8.0 mmol/L      eGFR 96.5 mL/min/1.73     Narrative:      GFR Categories in Chronic Kidney Disease (CKD)      GFR Category          GFR (mL/min/1.73)    Interpretation  G1                     90 or greater         Normal or high (1)  G2                      60-89                Mild decrease (1)  G3a                   45-59                Mild to moderate decrease  G3b                   30-44                Moderate to severe decrease  G4                    15-29                Severe decrease  G5                    14 or less           Kidney failure          (1)In the absence of evidence of kidney disease, neither GFR category G1 or G2 fulfill the criteria for CKD.    eGFR calculation 2021 CKD-EPI creatinine equation, which does not include race as a factor    Procalcitonin [[id number]]  (Normal) Collected: 02/01/25 1610    Specimen: Blood Updated: 02/01/25 1700     Procalcitonin 0.08 ng/mL     Narrative:      As a Marker for Sepsis (Non-Neonates):    1. <0.5 ng/mL represents a low risk of severe sepsis and/or septic shock.  2. >2 ng/mL represents a high risk of severe sepsis and/or septic shock.    As a Marker for Lower Respiratory Tract Infections that require antibiotic therapy:    PCT on Admission    Antibiotic Therapy       6-12 Hrs later    >0.5                Strongly Recommended  >0.25 - <0.5        Recommended   0.1 - 0.25          Discouraged              Remeasure/reassess PCT  <0.1                Strongly Discouraged     Remeasure/reassess PCT    As 28 day mortality risk marker:  "\"Change in Procalcitonin Result\" (>80% or <=80%) if Day 0 (or Day 1) and Day 4 values are available. Refer to http://[email or web address]-pct-calculator.[email or web address]    Change in PCT <=80%  A decrease of PCT levels below or equal to 80% defines a positive change in PCT test result representing a higher risk for 28-day all-cause mortality of patients diagnosed with severe sepsis for septic shock.    Change in PCT >80%  A decrease of PCT levels of more than 80% defines a negative change in PCT result representing a lower risk for 28-day all-cause mortality of patients diagnosed with severe sepsis or septic shock.       Lactic Acid, Plasma [[id number]]  (Normal) Collected: 02/01/25 1610    Specimen: Blood Updated: 02/01/25 1653     Lactate 0.8 mmol/L     BNP [[id number]]  (Normal) Collected: 02/01/25 1610    Specimen: Blood Updated: 02/01/25 1653     proBNP 412.9 pg/mL     Narrative:      This assay is used as an aid in the diagnosis of individuals suspected of having heart failure. It can be used as an aid in the diagnosis of acute decompensated heart failure (ADHF) in patients presenting with signs and symptoms of ADHF to the emergency department (ED). In addition, NT-proBNP of <300 pg/mL indicates ADHF is not likely.    Age Range Result Interpretation  NT-proBNP Concentration (pg/mL:      <50             Positive            >450                   Gray                 300-450                    Negative             <300    50-75           Positive            >900                  Gray                300-900                  Negative            <300      >75             Positive            >1800                  Gray                300-1800                  Negative            <300    CBC Auto Differential [[id number]]  (Abnormal) Collected: 02/01/25 1610    Specimen: Blood Updated: 02/01/25 1633     WBC 6.24 10*3/mm3      RBC 4.72 10*6/mm3      Hemoglobin 12.7 g/dL      Hematocrit 40.6 %      MCV 86.0 fL      MCH 26.9 pg      MCHC 31.3 " g/dL      RDW 14.2 %      RDW-SD 44.3 fl      MPV 9.5 fL      Platelets 312 10*3/mm3      Neutrophil % 59.9 %      Lymphocyte % 28.5 %      Monocyte % 6.6 %      Eosinophil % 3.7 %      Basophil % 1.0 %      Immature Grans % 0.3 %      Neutrophils, Absolute 3.74 10*3/mm3      Lymphocytes, Absolute 1.78 10*3/mm3      Monocytes, Absolute 0.41 10*3/mm3      Eosinophils, Absolute 0.23 10*3/mm3      Basophils, Absolute 0.06 10*3/mm3      Immature Grans, Absolute 0.02 10*3/mm3      nRBC 0.0 /100 WBC     High Sensitivity Troponin T [[id number]]  (Abnormal) Collected: 02/01/25 1610    Specimen: Blood Updated: 02/01/25 1748     HS Troponin T 14 ng/L     Narrative:      High Sensitive Troponin T Reference Range:  <14.0 ng/L- Negative Female for AMI  <22.0 ng/L- Negative Male for AMI  >=14 - Abnormal Female indicating possible myocardial injury.  >=22 - Abnormal Male indicating possible myocardial injury.   Clinicians would have to utilize clinical acumen, EKG, Troponin, and serial changes to determine if it is an Acute Myocardial Infarction or myocardial injury due to an underlying chronic condition.         Respiratory Panel PCR w/COVID-19(SARS-CoV-2) KATHY/LUCIEN/MARIE/PAD/COR/BESSY In-House, NP Swab in [place], 2 HR TAT - Swab, Nasopharynx [[id number]]  (Normal) Collected: 02/01/25 1614    Specimen: Swab from Nasopharynx Updated: 02/01/25 2479     ADENOVIRUS, PCR Not Detected     Coronavirus 229E Not Detected     Coronavirus HKU1 Not Detected     Coronavirus NL63 Not Detected     Coronavirus OC43 Not Detected     COVID19 Not Detected     Human Metapneumovirus Not Detected     Human Rhinovirus/Enterovirus Not Detected     Influenza A PCR Not Detected     Influenza B PCR Not Detected     Parainfluenza Virus 1 Not Detected     Parainfluenza Virus 2 Not Detected     Parainfluenza Virus 3 Not Detected     Parainfluenza Virus 4 Not Detected     RSV, PCR Not Detected     Bordetella pertussis pcr Not Detected     Bordetella parapertussis PCR  Not Detected     Chlamydophila pneumoniae PCR Not Detected     Mycoplasma pneumo by PCR Not Detected    Narrative:      In the setting of a positive respiratory panel with a viral infection PLUS a negative procalcitonin without other underlying concern for bacterial infection, consider observing off antibiotics or discontinuation of antibiotics and continue supportive care. If the respiratory panel is positive for atypical bacterial infection (Bordetella pertussis, Chlamydophila pneumoniae, or Mycoplasma pneumoniae), consider antibiotic de-escalation to target atypical bacterial infection.    Urine Drug Screen - Urine, Clean Catch [[id number]]  (Abnormal) Collected: 02/01/25 1648    Specimen: Urine, Clean Catch Updated: 02/01/25 1705     THC, Screen, Urine Negative     Phencyclidine (PCP), Urine Negative     Cocaine Screen, Urine Negative     Methamphetamine, Ur Negative     Opiate Screen Negative     Amphetamine Screen, Urine Negative     Benzodiazepine Screen, Urine Positive     Tricyclic Antidepressants Screen Negative     Methadone Screen, Urine Negative     Barbiturates Screen, Urine Negative     Oxycodone Screen, Urine Negative     Buprenorphine, Screen, Urine Negative    Narrative:      Cutoff For Drugs Screened:    Amphetamines               500 ng/ml  Barbiturates               200 ng/ml  Benzodiazepines            150 ng/ml  Cocaine                    150 ng/ml  Methadone                  200 ng/ml  Opiates                    100 ng/ml  Phencyclidine               25 ng/ml  THC                         50 ng/ml  Methamphetamine            500 ng/ml  Tricyclic Antidepressants  300 ng/ml  Oxycodone                  100 ng/ml  Buprenorphine               10 ng/ml    The normal value for all drugs tested is negative. This report includes unconfirmed screening results, with the cutoff values listed, to be used for medical treatment purposes only.  Unconfirmed results must not be used for non-medical purposes such  as employment or legal testing.  Clinical consideration should be applied to any drug of abuse test, particularly when unconfirmed results are used.      Fentanyl, Urine - Urine, Clean Catch [[id number]]  (Normal) Collected: 02/01/25 1648    Specimen: Urine, Clean Catch Updated: 02/01/25 1710     Fentanyl, Urine Negative    Narrative:      Negative Threshold:      Fentanyl 5 ng/mL     The normal value for the drug tested is negative. This report includes final unconfirmed screening results to be used for medical treatment purposes only. Unconfirmed results must not be used for non-medical purposes such as employment or legal testing. Clinical consideration should be applied to any drug of abuse test, particularly when unconfirmed results are used.           High Sensitivity Troponin T 1Hr [[id number]]  (Abnormal) Collected: 02/01/25 1759    Specimen: Blood Updated: 02/01/25 1847     HS Troponin T 14 ng/L      Troponin T Numeric Delta 0 ng/L      Troponin T % Delta 0    Narrative:      High Sensitive Troponin T Reference Range:  <14.0 ng/L- Negative Female for AMI  <22.0 ng/L- Negative Male for AMI  >=14 - Abnormal Female indicating possible myocardial injury.  >=22 - Abnormal Male indicating possible myocardial injury.   Clinicians would have to utilize clinical acumen, EKG, Troponin, and serial changes to determine if it is an Acute Myocardial Infarction or myocardial injury due to an underlying chronic condition.               CT Angiogram Chest   Final Result       1.  No evidence of aortic dissection, intramural hematoma, or   penetrating atherosclerotic ulcer. The aortic root and ascending aorta   are both dilated, similar to the prior study. Mild atherosclerotic   plaque in the aortic arch and descending thoracic aorta. Mild coronary   artery atherosclerotic calcification.       2.  Chronic mild left diaphragm elevation. Small bilateral pleural   effusions. Bibasilar atelectasis. Moderate to severe emphysema.                This report was signed and finalized on 2/1/2025 6:37 PM by Dr. William Templeton MD.          XR Chest 1 View   Final Result   No acute findings.       This report was signed and finalized on 2/1/2025 4:31 PM by Dr. William Templeton MD.            Medications   albuterol (PROVENTIL) nebulizer solution 0.083% 2.5 mg/3mL (2.5 mg Nebulization Given 2/1/25 1550)   ipratropium-albuterol (DUO-NEB) nebulizer solution 3 mL (3 mL Nebulization Given 2/1/25 1550)   morphine injection 4 mg (4 mg Intravenous Given 2/1/25 1753)   ondansetron (ZOFRAN) injection 4 mg (4 mg Intravenous Given 2/1/25 1749)   labetalol (NORMODYNE,TRANDATE) injection 20 mg (20 mg Intravenous Given 2/1/25 1750)   iopamidol (ISOVUE-370) 76 % injection 100 mL (100 mL Intravenous Given 2/1/25 1827)       Medical Decision Making   Patient was offered admission.  Patient declined admission.        I had a discussion with the patient regarding diagnosis, diagnostic results, treatment plan, and medications.  The patient indicated understanding of these instructions.  I spent adequate time at the bedside prior to discharge necessary to discuss the aftercare instructions, giving patient education, providing explanations of the results of our evaluations/findings, and my decision making to assure that the patient understand the plan of care.  Time was allotted to answer questions at that time and throughout the ED course.  Emphasis was placed on timely follow-up after discharge.  I also discussed the potential for the development of an acute emergent condition requiring further evaluation, return to the ER, admission, or even surgical intervention. I discussed that we found nothing during the visit today indicating the need for further ER workup at this time, admission to the hospital, or the presence of an acute unstable medical condition.  I encouraged the patient to return to the emergency department immediately for ANY concerns, worsening, new  complaints, or if symptoms persist and unable to seek follow-up in a timely fashion.  The patient expressed understanding and agreement with this plan.         Problems Addressed:  Dyspnea, unspecified type: complicated acute illness or injury  Elevated blood pressure reading: complicated acute illness or injury    Amount and/or Complexity of Data Reviewed  Labs: ordered. Decision-making details documented in ED Course.  Radiology: ordered. Decision-making details documented in ED Course.  ECG/medicine tests: ordered. Decision-making details documented in ED Course.    Risk  Prescription drug management.        Final diagnoses:   Dyspnea, unspecified type   Elevated blood pressure reading       ED Disposition  ED Disposition       ED Disposition   Discharge    Condition   Stable    Comment   --               PATIENT CONNECTION - [facility] [id number]  [phone]        [facility] EMERGENCY DEPARTMENT  [address] [place] [address] [id number] [phone]    As needed, If symptoms worsen         Medication List        New Prescriptions      albuterol 1.25 MG/3ML nebulizer solution  Commonly known as: ACCUNEB  Take 3 mL by nebulization Every 6 (Six) Hours As Needed for Shortness of Air.     predniSONE 50 MG tablet  Commonly known as: DELTASONE  Take 1 tablet by mouth Daily for 5 days.               Where to Get Your Medications        These medications were sent to [facility] [place], [place] - [address] - [phone] [facility] [phone]   [address] [facility], [facility] [address]      Phone: [phone]   albuterol 1.25 MG/3ML nebulizer solution  predniSONE 50 MG tablet            Tyrel Hernandez MD  02/01/25 1491

## 2025-02-03 LAB
QT INTERVAL: 414 MS
QTC INTERVAL: 465 MS